# Patient Record
Sex: FEMALE | Race: WHITE | NOT HISPANIC OR LATINO | Employment: UNEMPLOYED | URBAN - METROPOLITAN AREA
[De-identification: names, ages, dates, MRNs, and addresses within clinical notes are randomized per-mention and may not be internally consistent; named-entity substitution may affect disease eponyms.]

---

## 2023-02-23 NOTE — PATIENT INSTRUCTIONS
Breast Self Exam for Women   AMBULATORY CARE:   A breast self-exam (BSE)  is a way to check your breasts for lumps and other changes  Regular BSEs can help you know how your breasts normally look and feel  Most breast lumps or changes are not cancer, but you should always have them checked by a healthcare provider  Why you should do a BSE:  Breast cancer is the most common type of cancer in women  Even if you have mammograms, you may still want to do a BSE regularly  If you know how your breasts normally feel and look, it may help you know when to contact your healthcare provider  Mammograms can miss some cancers  You may find a lump during a BSE that did not show up on a mammogram   When you should do a BSE:  If you have periods, you may want to do your BSE 1 week after your period ends  This is the time when your breasts may be the least swollen, lumpy, or tender  You can do regular BSEs even if you are breastfeeding or have breast implants  Call your doctor if:   You find any lumps or changes in your breasts  You have breast pain or fluid coming from your nipples  You have questions or concerns about your condition or care  How to do a BSE:       Look at your breasts in a mirror  Look at the size and shape of each breast and nipple  Check for swelling, lumps, dimpling, scaly skin, or other skin changes  Look for nipple changes, such as a nipple that is painful or beginning to pull inward  Gently squeeze both nipples and check to see if fluid (that is not breast milk) comes out of them  If you find any of these or other breast changes, contact your healthcare provider  Check your breasts while you sit or  the following 3 positions:    [place] your arms down at your sides  Raise your hands and join them behind your head  Put firm pressure with your hands on your hips  Bend slightly forward while you look at your breasts in the mirror  Lie down and feel your breasts    When you lie down, your breast tissue spreads out evenly over your chest  This makes it easier for you to feel for lumps and anything that may not be normal for your breasts  Do a BSE on one breast at a time  Place a small pillow or towel under your left shoulder  Put your left arm behind your head  Use the 3 middle fingers of your right hand  Use your fingertip pads, on the top of your fingers  Your fingertip pad is the most sensitive part of your finger  Use small circles to feel your breast tissue  Use your fingertip pads to make dime-sized, overlapping circles on your breast and armpits  Use light, medium, and firm pressure  First, press lightly  Second, press with medium pressure to feel a little deeper into the breast  Last, use firm pressure to feel deep within your breast     Examine your entire breast area  Examine the breast area from above the breast to below the breast where you feel only ribs  Make small circles with your fingertips, starting in the middle of your armpit  Make circles going up and down the breast area  Continue toward your breast and all the way across it  Examine the area from your armpit all the way over to the middle of your chest (breastbone)  Stop at the middle of your chest     Move the pillow or towel to your right shoulder, and put your right arm behind your head  Use the 3 fingertip pads of your left hand, and repeat the above steps to do a BSE on your right breast   What else you can do to check for breast problems or cancer:  Talk to your healthcare provider about mammograms  A mammogram is an x-ray of your breasts to screen for breast cancer or other problems  Your provider can tell you the benefits and risks of mammograms  The first mammogram is usually at age 39 or 48  Your provider may recommend you start at 36 or younger if your risk for breast cancer is high  Mammograms usually continue every 1 to 2 years until age 76         Follow up with your doctor as directed:  Write down your questions so you remember to ask them during your visits  © [employer] 2022 Information is for End User's use only and may not be sold, redistributed or otherwise used for commercial purposes  The above information is an  only  It is not intended as medical advice for individual conditions or treatments  Talk to your doctor, nurse or pharmacist before following any medical regimen to see if it is safe and effective for you  Wellness Visit for Adults   AMBULATORY CARE:   A wellness visit  is when you see your healthcare provider to get screened for health problems  Your healthcare provider will also give you advice on how to stay healthy  Write down your questions so you remember to ask them  Ask your healthcare provider how often you should have a wellness visit  What happens at a wellness visit:  Your healthcare provider will ask about your health, and your family history of health problems  This includes high blood pressure, heart disease, and cancer  He or she will ask if you have symptoms that concern you, if you smoke, and about your mood  You may also be asked about your intake of medicines, supplements, food, and alcohol  Any of the following may be done: Your weight  will be checked  Your height may also be checked so your body mass index (BMI) can be calculated  Your BMI shows if you are at a healthy weight  Your blood pressure  and heart rate will be checked  Your temperature may also be checked  Blood and urine tests  may be done  Blood tests may be done to check your cholesterol levels  Abnormal cholesterol levels increase your risk for heart disease and stroke  You may also need a blood or urine test to check for diabetes if you are at increased risk  Urine tests may be done to look for signs of an infection or kidney disease  A physical exam  includes checking your heartbeat and lungs with a stethoscope   Your healthcare provider may also check your skin to look for sun damage  Screening tests  may be recommended  A screening test is done to check for diseases that may not cause symptoms  The screening tests you may need depend on your age, gender, family history, and lifestyle habits  For example, colorectal screening may be recommended if you are 48years old or older  Screening tests you need if you are a woman:   A Pap smear  is used to screen for cervical cancer  Pap smears are usually done every 3 to 5 years depending on your age  You may need them more often if you have had abnormal Pap smear test results in the past  Ask your healthcare provider how often you should have a Pap smear  A mammogram  is an x-ray of your breasts to screen for breast cancer  Experts recommend mammograms every 2 years starting at age 48 years  You may need a mammogram at age 52 years or younger if you have an increased risk for breast cancer  Talk to your healthcare provider about when you should start having mammograms and how often you need them  Vaccines you may need:   Get an influenza vaccine  every year  The influenza vaccine protects you from the flu  Several types of viruses cause the flu  The viruses change over time, so new vaccines are made each year  Get a tetanus-diphtheria (Td) booster vaccine  every 10 years  This vaccine protects you against tetanus and diphtheria  Tetanus is a severe infection that may cause painful muscle spasms and lockjaw  Diphtheria is a severe bacterial infection that causes a thick covering in the back of your mouth and throat  Get a human papillomavirus (HPV) vaccine  if you are female and aged 23 to 32 or male 23 to 24 and never received it  This vaccine protects you from HPV infection  HPV is the most common infection spread by sexual contact  HPV may also cause vaginal, penile, and anal cancers  Get a pneumococcal vaccine  if you are aged 72 years or older   The pneumococcal vaccine is an injection given to protect you from pneumococcal disease  Pneumococcal disease is an infection caused by pneumococcal bacteria  The infection may cause pneumonia, meningitis, or an ear infection  Get a shingles vaccine  if you are 60 or older, even if you have had shingles before  The shingles vaccine is an injection to protect you from the varicella-zoster virus  This is the same virus that causes chickenpox  Shingles is a painful rash that develops in people who had chickenpox or have been exposed to the virus  How to eat healthy:  My Plate is a model for planning healthy meals  It shows the types and amounts of foods that should go on your plate  Fruits and vegetables make up about half of your plate, and grains and protein make up the other half  A serving of dairy is included on the side of your plate  The amount of calories and serving sizes you need depends on your age, gender, weight, and height  Examples of healthy foods are listed below:  Eat a variety of vegetables  such as dark green, red, and orange vegetables  You can also include canned vegetables low in sodium (salt) and frozen vegetables without added butter or sauces  Eat a variety of fresh fruits , canned fruit in 100% juice, frozen fruit, and dried fruit  Include whole grains  At least half of the grains you eat should be whole grains  Examples include whole-wheat bread, wheat pasta, brown rice, and whole-grain cereals such as oatmeal     Eat a variety of protein foods such as seafood (fish and shellfish), lean meat, and poultry without skin (turkey and chicken)  Examples of lean meats include pork leg, shoulder, or tenderloin, and beef round, sirloin, tenderloin, and extra lean ground beef  Other protein foods include eggs and egg substitutes, beans, peas, soy products, nuts, and seeds  Choose low-fat dairy products such as skim or 1% milk or low-fat yogurt, cheese, and cottage cheese  Limit unhealthy fats  such as butter, hard margarine, and shortening  Exercise:  Exercise at least 30 minutes per day on most days of the week  Some examples of exercise include walking, biking, dancing, and swimming  You can also fit in more physical activity by taking the stairs instead of the elevator or parking farther away from stores  Include muscle strengthening activities 2 days each week  Regular exercise provides many health benefits  It helps you manage your weight, and decreases your risk for type 2 diabetes, heart disease, stroke, and high blood pressure  Exercise can also help improve your mood  Ask your healthcare provider about the best exercise plan for you  General health and safety guidelines:   Do not smoke  Nicotine and other chemicals in cigarettes and cigars can cause lung damage  Ask your healthcare provider for information if you currently smoke and need help to quit  E-cigarettes or smokeless tobacco still contain nicotine  Talk to your healthcare provider before you use these products  Limit alcohol  A drink of alcohol is 12 ounces of beer, 5 ounces of wine, or 1½ ounces of liquor  Lose weight, if needed  Being overweight increases your risk of certain health conditions  These include heart disease, high blood pressure, type 2 diabetes, and certain types of cancer  Protect your skin  Do not sunbathe or use tanning beds  Use sunscreen with a SPF 15 or higher  Apply sunscreen at least 15 minutes before you go outside  Reapply sunscreen every 2 hours  Wear protective clothing, hats, and sunglasses when you are outside  Drive safely  Always wear your seatbelt  Make sure everyone in your car wears a seatbelt  A seatbelt can save your life if you are in an accident  Do not use your cell phone when you are driving  This could distract you and cause an accident  Pull over if you need to make a call or send a text message  Practice safe sex  Use latex condoms if are sexually active and have more than one partner   Your healthcare provider may recommend screening tests for sexually transmitted infections (STIs)  Wear helmets, lifejackets, and protective gear  Always wear a helmet when you ride a bike or motorcycle, go skiing, or play sports that could cause a head injury  Wear protective equipment when you play sports  Wear a lifejacket when you are on a boat or doing water sports  © Copyright Sherie Salas 2022 Information is for End User's use only and may not be sold, redistributed or otherwise used for commercial purposes  The above information is an  only  It is not intended as medical advice for individual conditions or treatments  Talk to your doctor, nurse or pharmacist before following any medical regimen to see if it is safe and effective for you  HPV (Human Papillomavirus)   AMBULATORY CARE:   Human Papillomavirus (HPV)  is the name for a group of viruses that can infect your skin or other parts of your body  HPV is the most common infection spread by sexual contact  It can also be spread from a mother to her baby during delivery  Common symptoms include the following:   Painless warts in your mouth or on your genitals    Genital or anal discharge, bleeding, itching, or pain    Pain when you urinate    Call your doctor if:   You have new or worsening symptoms  You have questions or concerns about your condition or care  HPV diagnosis:  Your healthcare provider may use a vinegar liquid to help diagnose HPV genital warts  Women 27to 72years old can be checked for HPV during regular cervical cancer screenings  An HPV test checks for certain types of HPV that can cause changes in cervical cells  Without treatment, the changed cells can become cancer  An HPV test can be done every 5 years if the results show no infection  The test can be done with or without a Pap smear  A Pap smear checks for cancer or for abnormal cells that can become cancer   You may be tested for HPV if you have mouth or throat cancer  Treatment:  HPV cannot be cured, but an infection may go away on its own in about 2 years without causing problems  If the infection continues, some types of HPV can lead to health conditions that need to be treated  Examples include warts and certain cancers, especially squamous cell carcinoma (SCC)  HPV-linked SCCs commonly develop in the anus, throat (called oropharyngeal cancer), cervix, vagina, penis, or mouth  HPV can also cause a type of cervical cancer called adenocarcinoma  Symptoms of any of these conditions may not develop for several years after you were exposed to HPV  You will need to be monitored closely  Ask your healthcare provider for more information about monitoring, conditions caused by HPV, and available treatments  Prevent an HPV infection:  HPV is usually spread through sexual activity  The following can help prevent infection:  Ask about the HPV vaccine  The HPV vaccine is given to females and males, usually at 6or 15years of age  It can be given from 9 years through 39years of age, if needed  It is most effective if given before sexual activity begins  Use a new condom, contraceptive barrier, or dental dam each time you have sex  This includes oral, vaginal, and anal sex  Talk to your healthcare provider if you have any questions about what to use or how to use it  Follow up with your doctor as directed:  Write down your questions so you remember to ask them during your visits  © Copyright [facility] 2022 Information is for End User's use only and may not be sold, redistributed or otherwise used for commercial purposes  The above information is an  only  It is not intended as medical advice for individual conditions or treatments  Talk to your doctor, nurse or pharmacist before following any medical regimen to see if it is safe and effective for you    Kegel Exercises for Women   AMBULATORY CARE:   Kegel exercises  help strengthen your pelvic muscles  Pelvic muscles hold your pelvic organs, such as your bladder and uterus, in place  Kegel exercises help prevent or control certain conditions, such as urine incontinence (leakage) or uterine prolapse  Call your doctor or physical therapist if:   You cannot feel your muscles tighten or relax  You continue to leak urine  You have questions or concerns about your condition or care  Use the correct muscles:  Pelvic muscles are the muscles you use to control urine flow  To target these muscles, stop and start the flow of urine several times  This will help you become familiar with how it feels to tighten and relax these muscles  How to do Kegel exercises:   Get into a comfortable position  You may lie down, stand up, or sit down to do these exercises  When you first try to do these exercises, it may be easier if you lie down  Tighten or squeeze your pelvic muscles slowly  It may feel like you are trying to hold back urine or gas  Hold this position for 3 seconds  Relax for 3 seconds  Repeat this cycle 10 times  Do not hold your breath when you do Kegel exercises  Keep your stomach, back, and leg muscles relaxed  Do 10 sets of Kegel exercises, at least 3 times a day  When you know how to do Kegel exercises, use different positions  This will help to strengthen your pelvic muscles as much as possible  You can do these exercises while you lie on the floor, watch TV, or while you stand  Tighten your pelvic muscles before you sneeze, cough, or lift to prevent urine leakage  You may notice improved bladder control within about 6 weeks  Follow up with your doctor or physical therapist as directed:  Write down your questions so you remember to ask them during your visits  © Copyright [employer]it [employer]on 2022 Information is for End User's use only and may not be sold, redistributed or otherwise used for commercial purposes  The above information is an  only   It is not intended as medical advice for individual conditions or treatments  Talk to your doctor, nurse or pharmacist before following any medical regimen to see if it is safe and effective for you  Perineal Hygiene      Your vaginal naturally takes care of its self, it is a self washing system, the less you mess the healthier it will be     No soaps or feminine wash to the vulva, these products can cause dermitis, bacterial infections and other vulvar problems  Use only water to cleanse, or water with Dove or [employer] if necessary  No scented lotions or products are advised in or near your vulva  Use only coconut oil for moisture if needed  No douching this may cause imbalance in your vaginal PH and further issues  If you wear panty liners, you may apply a thin coating of Vaseline, A&D ointment or coconut oil to the vulvar tissues as a skin barrier     [place] underware, loose fitting clothing  Only perfume-free, dye-free laundry detergent, use a second rinse cycle   Avoid fabric softeners/dryer sheets  Partner should avoid the same products as well  Over the counter probiotic to restore vaginal bro may be helpful as well, take daily  You may also look into Boric Acid vaginal suppositories to restore vaginal PH balance for up to 2 weeks as directed on the box  You may not use these if you are pregnant      For vaginal dryness: You may use:     Coconut oil (organic, pure, unscented) as needed for moisture or lubrication  ( Do not use if allergic)       Replens moisture restore external comfort gel daily ( use as directed on the box)        Replens long lasting vaginal moisturizer  ( use as directed on the box)         For Vaginal Lubrication:          You may use:     Coconut oil (organic, pure, unscented) as a lubricant or another scent-free lubricant (Astroglide, Uberlube) if needed    Do not use coconut oil or silicone if using a condom as this may break down the integrity of the condom and cause an unplanned pregnancy              Do not use coconut oil if allergic               Replens silky smooth lubricant, premium silicone based lubricant for intercourse  ( use as directed, a small amount will provide an enhanced natural feeling)     Any premium over the counter vaginal lubricant water or silicone based  Silicone based will have more staying power  Natural Family Planning   AMBULATORY CARE:   Natural family planning  is a way to prevent or plan a pregnancy  Natural family planning helps you understand your body's rhythms to learn when you are most likely to get pregnant each month  Contact your healthcare provider if:   You have questions or concerns about natural family planning  Natural family planning methods: The symptothermal method  means you will chart your daily body temperature  You will need to buy a basal body thermometer at the store  Take your basal body temperature before you get out of bed in the morning  Keep track of each reading  Your basal body temperature will increase when you ovulate  You are more likely to get pregnant when it is higher  The cervical mucus discharge method  means you will check your cervical mucus each day  Discharge is clear, slippery, and stretchy when you ovulate  The amount is also higher than usual  Discharge is thick, sticky, and cloudy when you are not ovulating  Check your cervical mucus each day to learn when you ovulate  The calendar, or rhythm, method  means you will count the number of days between your periods  This will tell you when you ovulate  This method can work if the number of days in your cycle is the same each month  If you have irregular monthly periods, the natural family planning method is not as accurate  What else you need to know about natural family planning:   Natural family planning does not decrease your risk for a sexually transmitted infection (STI)      The success of natural family planning depends on how well you measure your menstrual cycle  You will need to learn the exact timing of your cycle and follow it closely  Your menstrual cycle can be affected by any of the following:     Changes in exercise habits    Caffeine or unhealthy foods    Stress, depression, and anxiety    Health problems or illness    Increasing age    Weight gain or loss    Risks of not following natural family planning correctly: You may become pregnant when you do not want to  It may be hard to know when you are fertile  Follow up with your doctor as directed:  Write down your questions so you remember to ask them during your visits  © Copyright \[facility]\"" [place] 2022 Information is for End User's use only and may not be sold, redistributed or otherwise used for commercial purposes  The above information is an  only  It is not intended as medical advice for individual conditions or treatments  Talk to your doctor, nurse or pharmacist before following any medical regimen to see if it is safe and effective for you

## 2023-02-23 NOTE — PROGRESS NOTES
Diagnoses and all orders for this visit:    Encounter for gynecological examination without abnormal finding    Pre-conception counseling  -     Prenatal Vit-Fe Fumarate-FA (prenatal vitamin) 28-0 8 mg; Take 1 tablet by mouth daily        Perineal hygiene reviewed   Weight bearing exercises minium of 150 mins/weekly advised  Kegel exercises recommended  SBE encouraged, ASCCP guidelines reviewed  Condoms encouraged with all sexual activity to prevent STI's  Gardisil vaccines recommended up to age 39  Calcium/ Vit D dietary requirements discussed,   Advised to call with any issues,  all concerns & questions addressed  See provided information in your after visit summary   Start prenatal vitamins   F/U Annually and PRN      Health Maintenance:    Last PAP: 04/23/2021 Neg  Next PAP Due: 2024    Last Mammogram: Not on file  advised age 36     Last Colonoscopy: Not on file    advised age 39    Gardisil: Completed       Subjective    CC: Yearly Exam      Cruzito Oakes is a 32 y o  female here for an annual exam  Alysha Wang  GYN hx includes: History of irregular menses & OCP since age 16 to regulate menses  Stopped taking 2 years ago  Reports regular cycles for the past 4 months   No personal Hx of breast, cervical, ovarian or colon CA  Family hx of:  No GYN cancers  Medically stable, reports no changes in medical Hx, follows with PMD    Patient's last menstrual period was 02/05/2023 (exact date)  Her menstrual cycles are regular every 28-30 days for the past 4 cycles, normally they are much longer in frequenci   She denies issues with bleeding during her menses  Denies history of abnormal pap smear  She denies breast concerns, abnormal vaginal discharge, vaginal itching, odor, irritation, bowel/bladder dysfunction, urinary symptoms, pelvic pain, or dyspareunia today  She is sexually active  Monogamous relationship  Her current method of contraception includes condoms  Denies any issues with her BCM  Would like to start trying to conceive this spring   She does not want STD testing today  Denies intimate partner violence    Works at [employer]  last year     Past Medical History:   Diagnosis Date   • Varicella     twice     History reviewed  No pertinent surgical history  Immunization History   Administered Date(s) Administered   • COVID-19 MODERNA VACC 0 5 ML IM 2021, 2021   • Tuberculin Skin Test-PPD Intradermal 2019       Family History   Problem Relation Age of Onset   • No Known Problems Mother    • Asthma Father    • Breast cancer Neg Hx    • Colon cancer Neg Hx    • Ovarian cancer Neg Hx    • Uterine cancer Neg Hx    • Cervical cancer Neg Hx      Social History     Tobacco Use   • Smoking status: Former   • Smokeless tobacco: Never   Vaping Use   • Vaping Use: Never used   Substance Use Topics   • Alcohol use: Not Currently     Comment: occ   • Drug use: Never       Current Outpatient Medications:   •  Multiple Vitamins-Minerals (WOMENS MULTI PO), Take 1 tablet by mouth daily, Disp: , Rfl:   •  Prenatal Vit-Fe Fumarate-FA (prenatal vitamin) 28-0 8 mg, Take 1 tablet by mouth daily, Disp: 90 tablet, Rfl: 3  Patient Active Problem List    Diagnosis Date Noted   • PCOS (polycystic ovarian syndrome) 2022   • Encounter for annual routine gynecological examination 2021   • Irregular menses 2021       Allergies   Allergen Reactions   • Escitalopram Rash   • Fluoxetine Rash   • Pollen Extract Sneezing   • Amoxicillin-Pot Clavulanate Itching and Rash   • Cefixime Rash   • Levonorgestrel Rash       OB History    Para Term  AB Living   0 0 0 0 0 0   SAB IAB Ectopic Multiple Live Births   0 0 0 0 0       Vitals:    23 1459   BP: 112/78   BP Location: Left arm   Patient Position: Sitting   Cuff Size: Standard   Weight: 58 5 kg (129 lb)   Height: 5' 1" (1 549 m)     Body mass index is 24 37 kg/m²      Review of Systems     Constitutional: Negative for chills, fatigue, fever, headaches, visual disturbances, and unexpected weight change  Respiratory: Negative for cough, & shortness of breath  Cardiovascular: Negative for chest pain       Gastrointestinal: Negative for Abd pain, nausea & vomiting, constipation and diarrhea  Genitourinary: Negative for difficulty urinating, dysuria, hematuria, dyspareunia, unusual vaginal bleeding or discharge  Skin: Negative skin changes    Physical Exam     Constitutional: Alert & Oriented x3, well-developed and well-nourished  No distress  HENT: Atraumatic, Normocephalic, Conjunctivae clear  Neck: Normal range of motion  Neck supple  No thyromegaly, mass, nodules or tenderness  Pulmonary: Effort normal    Abdominal: Soft  No tenderness or masses  Musculoskeletal: Normal ROM  Skin: Warm & Dry  Psychological: Normal mood, thought content, behavior & judgement     Breasts:   Right: tissue soft without masses, tenderness, skin changes or nipple discharge  No areas of erythema or pain  No subclavicular, axillary, pectoral adenopathy  Left:  tissue soft without masses, tenderness, skin changes or nipple discharge  No areas of erythema or pain  No subclavicular, axillary, pectoral adenopathy    Pelvic exam was performed with patient supine, lithotomy position  Labia: Negative rash, tenderness, lesion or injury on the right labia  Negative rash, tenderness, lesion or injury on the left labia  Urethral meatus:  Negative for  tenderness, inflammation or discharge  Uterus: not deviated, enlarged, fixed or tender  Cervix: No CMT, no discharge or friability  Right adnexa: no mass, no tenderness and no fullness  Left adnexa: no mass, no tenderness and no fullness  Vagina: No erythema, tenderness, masses, or foreign body in the vagina  No signs of injury around the vagina  No unusual vaginal discharge   Perineum without lesions, signs of injury, erythema or swelling    Inguinal Canal:        Right: No inguinal adenopathy or hernia present  Left: No inguinal adenopathy or hernia present

## 2023-02-24 ENCOUNTER — ANNUAL EXAM (OUTPATIENT)
Dept: OBGYN CLINIC | Facility: CLINIC | Age: 28
End: 2023-02-24

## 2023-02-24 VITALS
SYSTOLIC BLOOD PRESSURE: 112 MMHG | BODY MASS INDEX: 24.35 KG/M2 | DIASTOLIC BLOOD PRESSURE: 78 MMHG | WEIGHT: 129 LBS | HEIGHT: 61 IN

## 2023-02-24 DIAGNOSIS — Z31.69 PRE-CONCEPTION COUNSELING: ICD-10-CM

## 2023-02-24 DIAGNOSIS — Z01.419 ENCOUNTER FOR GYNECOLOGICAL EXAMINATION WITHOUT ABNORMAL FINDING: Primary | ICD-10-CM

## 2023-02-24 RX ORDER — PNV NO.95/FERROUS FUM/FOLIC AC 28MG-0.8MG
1 TABLET ORAL DAILY
Qty: 90 TABLET | Refills: 3 | Status: SHIPPED | OUTPATIENT
Start: 2023-02-24 | End: 2023-03-26

## 2023-02-24 NOTE — PROGRESS NOTES
Patient is here today for a gynecological annual exam    No questions or concerns today  Planning to conceive in the Spring  LMP: 2023   Menarche age: 15    BC: Condoms currently    Last pap: 2021     Hx of abnormal paps?  No    Gardasil: States series is completed

## 2023-05-05 ENCOUNTER — OFFICE VISIT (OUTPATIENT)
Dept: FAMILY MEDICINE CLINIC | Facility: CLINIC | Age: 28
End: 2023-05-05

## 2023-05-05 ENCOUNTER — APPOINTMENT (OUTPATIENT)
Dept: LAB | Facility: HOSPITAL | Age: 28
End: 2023-05-05

## 2023-05-05 VITALS
SYSTOLIC BLOOD PRESSURE: 106 MMHG | TEMPERATURE: 96.7 F | HEIGHT: 61 IN | HEART RATE: 58 BPM | OXYGEN SATURATION: 100 % | BODY MASS INDEX: 23.03 KG/M2 | WEIGHT: 122 LBS | DIASTOLIC BLOOD PRESSURE: 72 MMHG

## 2023-05-05 DIAGNOSIS — E28.2 PCOS (POLYCYSTIC OVARIAN SYNDROME): ICD-10-CM

## 2023-05-05 DIAGNOSIS — Z23 ENCOUNTER FOR IMMUNIZATION: Primary | ICD-10-CM

## 2023-05-05 DIAGNOSIS — Z00.00 ANNUAL PHYSICAL EXAM: ICD-10-CM

## 2023-05-05 LAB
ALBUMIN SERPL BCP-MCNC: 4.5 G/DL (ref 3.5–5)
ALP SERPL-CCNC: 42 U/L (ref 34–104)
ALT SERPL W P-5'-P-CCNC: 10 U/L (ref 7–52)
ANION GAP SERPL CALCULATED.3IONS-SCNC: 4 MMOL/L (ref 4–13)
AST SERPL W P-5'-P-CCNC: 15 U/L (ref 13–39)
BASOPHILS # BLD AUTO: 0.04 THOUSANDS/ÂΜL (ref 0–0.1)
BASOPHILS NFR BLD AUTO: 1 % (ref 0–1)
BILIRUB SERPL-MCNC: 0.45 MG/DL (ref 0.2–1)
BUN SERPL-MCNC: 12 MG/DL (ref 5–25)
CALCIUM SERPL-MCNC: 9.7 MG/DL (ref 8.4–10.2)
CHLORIDE SERPL-SCNC: 107 MMOL/L (ref 96–108)
CO2 SERPL-SCNC: 27 MMOL/L (ref 21–32)
CREAT SERPL-MCNC: 0.75 MG/DL (ref 0.6–1.3)
EOSINOPHIL # BLD AUTO: 0.11 THOUSAND/ÂΜL (ref 0–0.61)
EOSINOPHIL NFR BLD AUTO: 2 % (ref 0–6)
ERYTHROCYTE [DISTWIDTH] IN BLOOD BY AUTOMATED COUNT: 12.5 % (ref 11.6–15.1)
GFR SERPL CREATININE-BSD FRML MDRD: 108 ML/MIN/1.73SQ M
GLUCOSE P FAST SERPL-MCNC: 82 MG/DL (ref 65–99)
HCT VFR BLD AUTO: 40.3 % (ref 34.8–46.1)
HGB BLD-MCNC: 13.6 G/DL (ref 11.5–15.4)
IMM GRANULOCYTES # BLD AUTO: 0.01 THOUSAND/UL (ref 0–0.2)
IMM GRANULOCYTES NFR BLD AUTO: 0 % (ref 0–2)
LYMPHOCYTES # BLD AUTO: 1.95 THOUSANDS/ÂΜL (ref 0.6–4.47)
LYMPHOCYTES NFR BLD AUTO: 38 % (ref 14–44)
MCH RBC QN AUTO: 30.1 PG (ref 26.8–34.3)
MCHC RBC AUTO-ENTMCNC: 33.7 G/DL (ref 31.4–37.4)
MCV RBC AUTO: 89 FL (ref 82–98)
MONOCYTES # BLD AUTO: 0.37 THOUSAND/ÂΜL (ref 0.17–1.22)
MONOCYTES NFR BLD AUTO: 7 % (ref 4–12)
NEUTROPHILS # BLD AUTO: 2.62 THOUSANDS/ÂΜL (ref 1.85–7.62)
NEUTS SEG NFR BLD AUTO: 52 % (ref 43–75)
NRBC BLD AUTO-RTO: 0 /100 WBCS
PLATELET # BLD AUTO: 322 THOUSANDS/UL (ref 149–390)
PMV BLD AUTO: 9.7 FL (ref 8.9–12.7)
POTASSIUM SERPL-SCNC: 4.2 MMOL/L (ref 3.5–5.3)
PROT SERPL-MCNC: 7.2 G/DL (ref 6.4–8.4)
RBC # BLD AUTO: 4.52 MILLION/UL (ref 3.81–5.12)
SODIUM SERPL-SCNC: 138 MMOL/L (ref 135–147)
WBC # BLD AUTO: 5.1 THOUSAND/UL (ref 4.31–10.16)

## 2023-05-05 NOTE — PROGRESS NOTES
[place] [address] [address]    NAME: Reyes Bloch  AGE: 29 y o  SEX: female  : 1995     DATE: 2023     Assessment and Plan:     Problem List Items Addressed This Visit    None  Visit Diagnoses     Encounter for immunization    -  Primary    Relevant Orders    TDAP VACCINE GREATER THAN OR EQUAL TO 6YO IM (Completed)    Annual physical exam            Immunizations and preventive care screenings were discussed with patient today  Appropriate education was printed on patient's after visit summary  Counseling:  Alcohol/drug use: discussed moderation in alcohol intake, the recommendations for healthy alcohol use, and avoidance of illicit drug use  Dental Health: discussed importance of regular tooth brushing, flossing, and dental visits  Sexual health: discussed sexually transmitted diseases, partner selection, use of condoms, avoidance of unintended pregnancy, and contraceptive alternatives  Exercise: the importance of regular exercise/physical activity was discussed  Recommend exercise 3-5 times per week for at least 30 minutes  Return in about 1 year (around 2024) for Annual physical      Chief Complaint:     Chief Complaint   Patient presents with    Physical Exam     Wants a tetanus      History of Present Illness:     Adult Annual Physical   Patient here for a comprehensive physical exam  The patient reports no problems  Will be trying for pregnancy starting next month  She needs her Tdap vaccine  Diet and Physical Activity  Diet/Nutrition: well balanced diet  Exercise: no formal exercise  Depression Screening  PHQ-2/9 Depression Screening    Little interest or pleasure in doing things: 0 - not at all  Feeling down, depressed, or hopeless: 0 - not at all  PHQ-2 Score: 0  PHQ-2 Interpretation: Negative depression screen       General Health  Sleep: sleeps well     Hearing: normal - bilateral   Vision: goes for regular eye exams, most recent eye exam <1 year ago and wears glasses  Dental: regular dental visits, brushes teeth twice daily and flosses teeth occasionally  /GYN Health  Last menstrual period: 5/1/23  Contraceptive method: none  History of STDs?: no      Review of Systems:     Review of Systems      Past Medical History:     Past Medical History:   Diagnosis Date    Varicella     twice      Past Surgical History:     History reviewed  No pertinent surgical history  Social History:     Social History     Socioeconomic History    Marital status: /Civil Union     Spouse name: None    Number of children: None    Years of education: None    Highest education level: None   Occupational History    None   Tobacco Use    Smoking status: Former    Smokeless tobacco: Never   Vaping Use    Vaping Use: Never used   Substance and Sexual Activity    Alcohol use: Not Currently     Comment: occ    Drug use: Never    Sexual activity: Yes     Partners: Male     Birth control/protection: Condom   Other Topics Concern    None   Social History Narrative    None     Social Determinants of Health     Financial Resource Strain: Not on file   Food Insecurity: Not on file   Transportation Needs: Not on file   Physical Activity: Not on file   Stress: Not on file   Social Connections: Not on file   Intimate Partner Violence: Not on file   Housing Stability: Not on file      Family History:     Family History   Problem Relation Age of Onset    No Known Problems Mother     Asthma Father     Breast cancer Neg Hx     Colon cancer Neg Hx     Ovarian cancer Neg Hx     Uterine cancer Neg Hx     Cervical cancer Neg Hx       Current Medications:     Current Outpatient Medications   Medication Sig Dispense Refill    Prenatal Vit-Fe Fumarate-FA (prenatal vitamin) 28-0 8 mg Take 1 tablet by mouth daily 90 tablet 3     No current facility-administered medications for this visit  "Allergies: Allergies   Allergen Reactions    Escitalopram Rash    Fluoxetine Rash    Pollen Extract Sneezing    Amoxicillin-Pot Clavulanate Itching and Rash    Cefixime Rash    Levonorgestrel Rash      Physical Exam:     /72 (BP Location: Left arm, Patient Position: Sitting, Cuff Size: Adult)   Pulse 58   Temp (!) 96 7 °F (35 9 °C)   Ht 5' 1\" (1 549 m)   Wt 55 3 kg (122 lb)   SpO2 100%   BMI 23 05 kg/m²     Physical Exam  Vitals reviewed  Constitutional:       General: She is not in acute distress  Appearance: Normal appearance  HENT:      Head: Normocephalic and atraumatic  Right Ear: External ear normal       Left Ear: External ear normal       Nose: Nose normal       Mouth/Throat:      Mouth: Mucous membranes are moist    Eyes:      Extraocular Movements: Extraocular movements intact  Conjunctiva/sclera: Conjunctivae normal       Pupils: Pupils are equal, round, and reactive to light  Cardiovascular:      Rate and Rhythm: Normal rate and regular rhythm  Heart sounds: Normal heart sounds  Pulmonary:      Effort: Pulmonary effort is normal       Breath sounds: Normal breath sounds  Abdominal:      General: Bowel sounds are normal  There is no distension  Palpations: Abdomen is soft  Tenderness: There is no abdominal tenderness  Musculoskeletal:      Cervical back: Neck supple  Right lower leg: No edema  Left lower leg: No edema  Lymphadenopathy:      Cervical: No cervical adenopathy  Skin:     General: Skin is warm  Capillary Refill: Capillary refill takes less than 2 seconds  Findings: No rash  Neurological:      Mental Status: She is alert  Mental status is at baseline          Flores Davis DO   [place] [address] [address]"

## 2023-06-30 ENCOUNTER — ULTRASOUND (OUTPATIENT)
Dept: OBGYN CLINIC | Facility: MEDICAL CENTER | Age: 28
End: 2023-06-30
Payer: COMMERCIAL

## 2023-06-30 VITALS
SYSTOLIC BLOOD PRESSURE: 100 MMHG | BODY MASS INDEX: 22.62 KG/M2 | WEIGHT: 119.8 LBS | DIASTOLIC BLOOD PRESSURE: 66 MMHG | HEIGHT: 61 IN

## 2023-06-30 DIAGNOSIS — N91.2 AMENORRHEA: Primary | ICD-10-CM

## 2023-06-30 DIAGNOSIS — Z36.89 ESTABLISH GESTATIONAL AGE, ULTRASOUND: ICD-10-CM

## 2023-06-30 DIAGNOSIS — Z3A.08 8 WEEKS GESTATION OF PREGNANCY: ICD-10-CM

## 2023-06-30 PROCEDURE — 76801 OB US < 14 WKS SINGLE FETUS: CPT | Performed by: CLINICAL NURSE SPECIALIST

## 2023-06-30 PROCEDURE — 99213 OFFICE O/P EST LOW 20 MIN: CPT | Performed by: CLINICAL NURSE SPECIALIST

## 2023-06-30 NOTE — ASSESSMENT & PLAN NOTE
She is a  with Patient's last menstrual period was 2023 (exact date)  US today is showing a viable IUP heather is c/w GA by LMP  No change in [facility]   24  F/U for ob intake, followed by initial prenatal exam in  2 wks

## 2023-06-30 NOTE — PATIENT INSTRUCTIONS
"Again, congratulations on your pregnancy! NEXT STEPS  Get Prenatal bloodwork  Call MFM to schedule next ultrasound (done 12-13 wks)   Contact information for [facility] ([facility]A Maternal Fetal Medicine)- Main Number [phone]   Return to our office in 1-2 weeks for an OB intake and initial prenatal Exam(or sooner if any problems/concerns arise- see packet for things to report)  Check out [employer] website to read the [employer]" -this has lots of great early pregnancy information     It can be found by going to [employer]  org-->select services-->select women's health-->select Obstetrics  [email or web address]    Below is a variety of information that is useful in early pregnancy   Genetic screening can be very confusing for most people   We do recommend genetic screening universally for all pregnant women  Our goal is to have the most healthy uncomplicated pregnancy possible and this is one way to identify possible complications early  Common disorders we can screen for include: Down Syndrome, Neural tube defects ( like spina bifida or gastroschisis) and trisomy 15, even possibly more  There are several options we will talk more about  Below is some information to get you started thinking about this  We will discuss this more at the next appt  GUIDE TO GENETIC TESTING    Aneuploidy Testing  Trisomy 21 (Down Syndrome), Trisomy 18, and Open Neural Tube Defects (Spina Bifida)  You may choose one of the following options: See below for CPT/Diagnostic codes  NIPT (Non-Invasive Prenatal Testing or Cell Free- Fetal DNA): This simple and accurate non-invasive prenatal screening blood test offers results for early risk assessment of Down syndrome (Trisomy 21), or Trisomy 25, trisomy 15 and other aneuploidy conditions  The test also offers an optional analysis for fetal sex    The test analyzes the relative amount of 21, 18, 13; X and Y chromosome " material in circulating cell-free DNA from a maternal blood sample  This test can be performed at any time after 10 weeks gestation  If you elect this test, you will also have an AFP (alpha-fetoprotein) blood test to test for open neural tube defects  Recommended follow up to a positive result is genetic counseling and prenatal diagnosis  Sequential Screening with Nuchal Translucency: This is a two-step test to detect whether a fetus is at increased risk for trisomy 24, trisomy 25, trisomy 15 and open neural tube defects  The test has a narrow window for testing (the first step must be performed between 10 and 13 weeks gestation)  It includes two blood draws and an ultrasound  The ultrasound measures the amount of fluid behind the baby’s neck called the nuchal translucency (NT)  The blood tests measures three different maternal hormone levels, -- pregnancy associated plasma protein (DARREL-A), human chorionic gonadotrophin (hCG), and dimeric inhibin A (SILVA)  These measurements in combination with some maternal information such as height and weight are used to calculate whether the baby is at increased risk for Down Syndrome or Trisomy 18  An alpha-fetoprotein test (AFP) is also included to test for open neural tube defects  Recommended follow up to a positive result is additional testing that is more definitive, and referral for genetic counseling and prenatal diagnosis  Quad Screen: This test is also known as the quadruple marker test   It is a test that measures levels of four substances in a pregnant woman’s blood--Alpha-fetoprotein (AFP), a protein made by the developing baby; Human chorionic gonadotropin (hCG), a hormone made by the placenta; Estriol, a hormone made by the placenta and the baby’s liver; and Inhibin A, another hormone made by the placenta    Typically, the quad screen is done between weeks 15 and 20 of pregnancy--the second trimester and the results indicate whether the baby is at higher risk for Down Syndrome (Trisomy 21), Trisomy 18, and open neural tube defects  This is a screening test   Recommended follow up to a positive result is additional testing that is more definitive, as well as referral for genetic counseling and prenatal diagnosis  Trisomy 21 Trisomy 18 Trisomy 13   NIPT  (FPR 0 1%) <99% <98% 99%   Sequential Screening (FPR 3 5%) 92% 90% N/A   Quad Screen   (FPR 5%) 83% 80% N/A   (FPR is False Positive Rate)       Additional Screening Tests Offered  Cystic Fibrosis: Cystic Fibrosis is the most common inherited disease of children and young adults  The carrier frequency is 1 in 24, to 1 in 97  Both parents need to be carriers for a child to be affected (25% chance)  One in 200, children born are affected  Cystic fibrosis is a disorder of mucus production and produces abnormally thick mucus leading to life threatening lung infections, digestion problems, poor growth, infertility, and more  Symptoms range from mild to severe, but individuals with severe disease may die in childhood  With treatments today, people with Cystic Fibrosis can live into their 30’s  CF does not affect intelligence  Recommended follow up to a positive result is testing of the baby’s father  Spinal Muscular Atrophy (SMA): SMA is the most common inherited cause of early childhood death  The carrier frequency is 1 in 52 to 1 in 67 in the [place] and both parents need to be carriers for a child to be affected (25% chance)  1 in 11,000 children are affected  SMA is a progressive degeneration of lower motor neurons  Muscle weakness is the most common type with respiratory failure by the age of 3years old  Muscles responsible for crawling, walking, swallowing, and head and neck control are most severely affected  Recommended follow up to a positive result is testing of the baby’s father        Fragile X Syndrome (the most common inherited cause of developmental delays): Fragile X syndrome is an “X-linked” genetic disease, which means it is only carried by the mom  Unfortunately, 1 in 250 females are carriers and a child has a 50% chance of being affected if this is the case  1 in 4000 boys is affected with Fragile X and 1 in 8000 girls is affected  Approximately 1/3 of all children born with Fragile X also have autism and hyperactivity  Recommended follow up to a positive result is genetic counseling and prenatal diagnosis  Guide To Insurance Coverage For Genetic Screening  Due to the complexity of coverage guidelines, we are unable to quote benefits or guarantee insurance coverage for any of these tests  Insurance benefits are plan-specific and offer vastly different coverage based on your policy  The handout attached explains the benefits to each test, and also provides the billing (CPT) codes for each test   Even if the testing is covered, it could be applied to any unmet deductibles, and copays may apply, resulting in a bill  You are encouraged to contact your insurance company to obtain your benefits based on your age and risk factors, so that there are no surprises  Test Insurance Procedure (CPT) code   NIPT-cell free fetal DNA Most accurate non-invasive test- not always covered w/o risk factors 17270   Sequential Screen w/ NT US Current standard test-should be covered Part 1: (if lab is [facility]) 54767,96221   (If lab is [facility]) [address]  Part 2: (if lab is [facility])37167,98497,93674, [address]  (If lab is [facility]) 01514   Quad screen Old standard-use if past 1st trimester 38670, [address] [facility], [address], [address]   CF- Cystic Fibrosis  89304   SMA- Spinal Musc   Atrophy  68037   Fragile X  [id number]       Diagnosis code used Varies based on history- But will be one of these:  Encounter for  screening for other genetic defect Z36 8  Advanced Maternal Age (over 28) O12 46  Family History of Genetic Disease Carrier Z84 81    Please contact your insurance company with the appropriate CPT code from the attached list, and diagnosis code applicable to your situation  We ask that you review this information and decide what testing you would like to have performed  Please note that the Sequential Screening with Nuchal Translucency has a smaller window of time to be performed during pregnancy (Prior to 14 wks)  Warning Signs During Pregnancy  The list below includes warning signs your providers would like you to be aware of  If you experience any of these at any time during your pregnancy, please call us as soon as possible  Vaginal bleeding   Sharp abdominal pain that does not go away   Fever (more than 100  4? F and is not relieved with Tylenol)   Persistent vomiting lasting greater than 24 hours   Chest pain   Pain or burning when you urinate   Severe headache that doesn’t resolve with Tylenol   Blurred vision or seeing spots in your vision   Sudden swelling of your face or hands   Redness, swelling or pain in a leg   A sudden weight gain in just a few days   Decrease in your baby’s movements (after 28 weeks or the 6th month of pregnancy)   A loss of watery fluid from your vagina - can be a gush, a trickle or  continuous wetness   After 20 weeks of pregnancy, rhythmic cramping (greater than 4 per hour)  or menstrual like low/pelvic pain    Call the OFFICE [phone] for any questions/emergencies  At night or on the weekend, please indicate it is an emergency and the DOCTOR on call will be paged      Discomforts of Early Pregnancy    Tips for coping with nausea and vomiting during pregnancy   Eat meals and snacks slowly   Eat every 1-2 hours to avoid a full stomach   Don’t skip meals, avoid empty stomach   Eat a snack prior to getting out of bed   Avoid food and beverages with a strong aroma   Avoid dehydration - drink enough fluid to keep the urine pale yellow   Drink fluids before a meal to minimize the effect of a full stomach   Limit the amount of coffee and beverages that contain caffeine Eliminate spicy, odorous, high fat (fried foods), acidic (tomato products) and sweet foods   Fluids that contain lemon (lemonade), mint (tea) or orange can usually be well tolerated   Snacks and meals that contain low-fat protein (lean meats, fish, poultry and eggs) along with eating easily digestible carbs (fruit, rice, toast, crackers and dry cereal) may be tolerated better   Foods with ginger may be well tolerated  May use ginger root powder, capsules or extract (up to 1000 mg per day)   Drink liquids in small amounts    If symptoms persist, please contact your provider  Tips for coping with constipation during pregnancy   Increase fiber and fluids   - Drink 8-10 cups of liquid, like water or low-sugar juice daily  - Keep urine pale with fluids (water, milk), fruit and vegetables   Eat a well-balanced diet that contains high fiber food (fruits, vegetables, whole grain breads and cereals, bran and dried beans)   Take a 30-minute walk daily   You may take a mild stool softener such as Colace®    If symptoms persist, please contact your provider  For any emergencies, PLEASE CALL THE OFFICE at [phone]  If the office is closed, the doctor on call will be paged by the answering service  Medications and Pregnancy- see Pregnancy Essentials guide online- page 9    Expected Weight Gain During Pregnancy  If you have a healthy BMI (18-25) prior to pregnancy:  The recommended weight gain is between 25-35 pounds  Approximate weight gain  in the first trimester is 1-4 5 pounds  An expected weight gain during the second and  third trimester is approximately one pound per week  If you have a BMI of less than 18 prior to pregnancy,  you are considered underweight:  The recommended weight gain is between 28-40 pounds  Approximate weight gain  in the first trimester is 1-4 5 pounds  An expected weight gain during the second and  third trimester is just over one pound per week    If your BMI is 25 to 29 9: you are considered overweight:  You should gain 1/2 to 2/3 pound during the second and third trimester, for a total  weight gain of 15 to 25 pounds  If you have a BMI of greater than 30 prior to pregnancy,  you are considered overweight:  The recommended weight gain is between 15-25 pounds  Approximate weight gain  in the first trimester is 1-4 5 pounds  An expected weight gain during the second  and third trimester is approximately 0 5 pound per week  Foods to avoid during pregnancy:   Unpasteurized milk, juice and cheese  - Soft cheeses like feta or brie (if made with UNPASTEURIZED milk)   Unheated deli meats like lunchmeat and hotdogs   Undercooked poultry, beef, pork, seafood including raw sushi    What fish is safe to eat during pregnancy? Eat 8 to 12 ounces of fish a week  Pick from this group frequently, especially if you follow  the American Heart Association’s recommendation to eat fish at least 2 times a week  AVOID HIGH MERCURY FISH  A single meal of the following fish can put  you over the Environmental Protection  Agency’s safe limit for the month  High mercury fish:  Shark  Swordfish  [employer]  Tile Fish    Caffeine and Pregnancy    The March  and [employer] of Obstetrics and Gynecologists (ACOG) urge pregnant  women to limit their caffeine consumption to no more than 200 milligrams (mg) per day  This is  comparable to having one 12-ounce cup of coffee a day  This level has been shown not to increase risk  of miscarriage, growth or  labor complications  Effects of higher levels are not known  Exercise During Pregnancy  A daily exercise program that consists of 30 minutes a day is recommended  Low impact exercises like walking and swimming are great exercises throughout  all of pregnancy   If you’re an avid strength  avoid lifting very heavy weights - nothing more  than 30 pounds    Drink plenty of fluids while exercising to stay hydrated    Be careful to avoid overheating  ACTIVITIES TO AVOID   Exercises that can make you lose your balance  Activities that can put your baby at risk i e  horseback riding, scuba diving, skiing  or snowboarding  Any other sport that puts you at risk for getting hit in the  abdominal area  Do not use saunas, steam rooms or hot tubs (that have a higher temperature  than 100F)   After the first trimester, avoid exercises that require you to lay flat on your back  Avoid exceeding a heart rate greater than 140 beats per minute   As long as you are  able to hold a conversation while exercising your heart rate is likely acceptable

## 2023-06-30 NOTE — PROGRESS NOTES
"   Subjective  Patient ID: Yesenia Murdock is a 29 y o  female here for Pregnancy Ultrasound (Lmp /Jayesh 24 8w4d/Patient c/o nausea, some vomiting once, breast tenderness and left side mild pain /She denies and vaginal bleeding  This is the patient first pregnancy /She have no issues or concerns today )    Newly Pregnant  Patient's last menstrual period was 2023 (exact date)  giving her an JAYESH of 24 and a gestational age of  11 weeks 4days (based on LMP)    Menstrual cycle: regular, cycle length:  30 days- does have a hx of irregular periods  Pregnancy was planned  She has started taking a prenatal vitamin    She is C/O amenorrhea  Signs and symptoms of pregnancy:   • Breast tenderness: yes  • Fatigue: yes  • Cramping or Pelvic Pain: no  • Spotting or Vaginal Bleeding: no  • Nausea or vomiting: yes- mostly nausea    OB History    Para Term  AB Living   1 0 0 0 0 0   SAB IAB Ectopic Multiple Live Births   0 0 0 0 0      # Outcome Date GA Lbr Jarad/2nd Weight Sex Delivery Anes PTL Lv   1 Current                 The following portions of the patient's history were reviewed and updated as appropriate: allergies, current medications, past family history, past medical history, past social history, past surgical history, and problem list   Perinent hx that may affect pregnancy    The following portions of the patient's history were reviewed and updated as appropriate: allergies, current medications, past family history, past medical history, past social history, past surgical history, and problem list     Review of Systems    See HPI for pertinent positives  /66 (BP Location: Left arm, Patient Position: Sitting)   Ht 5' 1\" (1 549 m)   Wt 54 3 kg (119 lb 12 8 oz)   LMP 2023 (Exact Date)   BMI 22 64 kg/m²   OBGyn Exam      FIRST TRIMESTER OBSTETRIC ULTRASOUND     Patient's last menstrual period was 2023 (exact date)      INDICATION: Establish Gestational " Age       FINDINGS:  See imaging report for details     Additional Findings: none     FHR: 160  IMPRESSION:    Single intrauterine pregnancy of 8 weeks 0days gestational age  Fetal cardiac activity detected  No adnexal masses seen  EDC by LMP: 24  EDC by this Ultrasound: 24     Assigning a Final JAYESH  Please choose how you are assigning the JAYESH: The gestational age by LMP is </= 8w 6d and demonstrates 5 or fewer days difference from the gestational age by [facility], therefore the final JAYESH will be based on the LMP    Final JAYESH:  by LMP  DIAMOND [facility] [address]  Dept: [phone]  Dept Fax: [phone]  Loc: [phone]  Loc Fax: [phone]  Ultrasound Probe Disinfection    A transvaginal ultrasound was performed  Prior to use, disinfection was performed with High Level Disinfection Process (Trophon)  Probe serial number F: [id number] was used  Assessment/Plan:                 Problem List Items Addressed This Visit     8 weeks gestation of pregnancy     She is a  with Patient's last menstrual period was 2023 (exact date)  US today is showing a viable IUP heather is c/w GA by LMP  No change in [facility]   24  F/U for ob intake, followed by initial prenatal exam in  2 wks               Other Visit Diagnoses     Amenorrhea    -  Primary    Relevant Orders    Ambulatory Referral to Maternal Fetal Medicine    AMB US OB < 14 weeks single or first gestation level 1 (Completed)    Establish gestational age, ultrasound              Orders Placed This Encounter   Procedures   • Ambulatory Referral to Maternal Fetal Medicine   • AMB US OB < 14 weeks single or first gestation level 1

## 2023-07-14 ENCOUNTER — INITIAL PRENATAL (OUTPATIENT)
Dept: OBGYN CLINIC | Facility: CLINIC | Age: 28
End: 2023-07-14

## 2023-07-14 VITALS — HEIGHT: 61 IN | BODY MASS INDEX: 22.47 KG/M2 | WEIGHT: 119 LBS

## 2023-07-14 DIAGNOSIS — Z34.01 ENCOUNTER FOR SUPERVISION OF NORMAL FIRST PREGNANCY IN FIRST TRIMESTER: Primary | ICD-10-CM

## 2023-07-14 PROCEDURE — OBC: Performed by: CLINICAL NURSE SPECIALIST

## 2023-07-14 NOTE — PROGRESS NOTES
OB INTAKE INTERVIEW  Patient is 28 y.o.y.o. who presents for OB intake at 10wks  She is accompanied by herself during this encounter  The father of her baby Cleopatra Holstein) is involved in the pregnancy and is 28years old.   Last Menstrual Period: 2023   Ultrasound: Measured 8 weeks 0 days on 2023 by Kamille Hart  Estimated Date of Delivery: 2024 confirmed by 8 week US    Signs/Symptoms of Pregnancy  Current pregnancy symptoms: nausea   Constipation no  Headaches no  Cramping/spotting no  PICA cravings no    Diabetes-  Body mass index is 22.48 kg/m². If patient has 1 or more, please order early 1 hour GTT  History of GDM no  BMI >35 no  History of PCOS or current metformin use no  History of LGA/macrosomic infant (4000g/9lbs) no    If patient has 2 or more, please order early 1 hour GTT  BMI>30 no  AMA no  First degree relative with type 2 diabetes no  History of chronic HTN, hyperlipidemia, elevated A1C no  High risk race (, , ,  or ) no    Hypertension- if you answer yes, please order preeclampsia labs (cbc, comprehensive metabolic panel, urine protein creatinine ratio, uric acid)  History of of chronic HTN no  History of gestational HTN no  History of preeclampsia, eclampsia, or HELLP syndrome no  History of diabetes no  History of lupus, autoimmune disease, kidney disease no    Thyroid- if yes order TSH with reflex T4  History of thyroid disease no    Bleeding Disorder or Hx of DVT-patient or first degree relative with history of. Order the following if not done previously.    (Factor V, antithrombin III, prothrombin gene mutation, protein C and S Ag, lupus anticoagulant, anticardiolipin, beta-2 glycoprotein)   no    OB/GYN-  History of abnormal pap smear no       Date of last pap smear 2021  History of HPV no  History of Herpes/HSV no  History of other STI (gonorrhea, chlamydia, trich) no  History of prior  no  History of prior  no  History of  delivery prior to 36 weeks 6 days no  History of blood transfusion no  Ok for blood transfusion YES    Substance screening- if yes outside of tobacco for her or anyone in her home-order urine drug screen  History of tobacco use no  Currently using tobacco no  Currently using alcohol no  Presently using drugs no  Past drug use  no  IV drug use- no  Partner drug use no  Parent/Family drug use no    MRSA Screening-   Does the pt have a hx of MRSA? no  If yes- please follow MRSA protocol and obtain a nasal swab for MRSA culture    Immunizations:  Influenza vaccine given this season NO  Discussed Tdap vaccine YES  Discussed COVID Vaccine YES    Genetic/MFM-  Do you or your partner have a history of any of the following in yourselves or first degree relatives? Cystic fibrosis no  Spinal muscular atrophy no  Hemoglobinopathy/Sickle Cell/Thalassemia no  Fragile X Intellectual Disability no    If yes, discuss carrier screening and recommend consultation with MFM/genetic counseling. If no, discuss option for carrier screening and/or genetic testing with Nuchal Ultrasound. Patient interested thinking about it- had a consult  Appointment at [facility] made YES both set up    Rupert Scruggs's Pregnancy Essentials Book reviewed, discussed and attached to their AVS YES    Nurse/Family Partnership- patient may qualify YES; referral placed NO    Prenatal lab work scripts YES  Extra labs ordered:   Thinking about carrier screening        Aspirin/Preeclampsia Screen    Risk Level Risk Factor Recommendation   LOW Prior Uncomplicated full-term delivery no No Aspirin recommendation        MODERATE Nulliparity YES Recommend low-dose aspirin if     BMI>30 YES 2 or more moderate risk factors    Family History Preeclampsia (mother/sister) no     35yr old or greater no      or Low Socioeconomic no     IVF Pregnancy  no     Personal History Risks (low birth weight, prior adverse preg outcome, >10yr preg interval) no         HIGH History of Preeclampsia no Recommend low-dose aspirin if     Multifetal gestation no 1 or more high risk factors    Chronic HTN no     Type 1 or 2 Diabetes no     Renal Disease no     Autoimmune Disease  no      Contraindications to ASA therapy:  NSAID/ ASA allergy: no  Nasal polyps: no  Asthma with history of ASA induced bronchospasm: no  Relative contraindications:  History of GI bleed: no  Active peptic ulcer disease: no  Severe hepatic dysfunction: no    Patient should be recommended to take ASA 162mg during this pregnancy from 12-36wks to lower her risk of preeclampsia: NO      The patient has a history now or in prior pregnancy notable for:   none      Details that I feel the provider should be aware of: Linda Stockton and João Lara are expecting their 1st baby! Previous pt with NAOMI. She is hanging in there, had a lot of early nausea issues. We discussed things to help with that and it is improving. 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 PN1 visit scheduled. The patient was oriented to our practice, reviewed delivering physicians and [facility] for Delivery. All questions were answered.     Interviewed by: Tarik Kapoor MA

## 2023-07-14 NOTE — PATIENT INSTRUCTIONS
Congratulations!! Please review our Pregnancy Essential Guide and [facility] L[facility]D Virtual tour from our [facility]. . [facility] Pregnancy Essentials Guide  [facility] ([facility])     [address] [facility] ([employer])

## 2023-07-15 ENCOUNTER — APPOINTMENT (OUTPATIENT)
Dept: LAB | Facility: HOSPITAL | Age: 28
End: 2023-07-15
Payer: COMMERCIAL

## 2023-07-15 DIAGNOSIS — Z34.01 ENCOUNTER FOR SUPERVISION OF NORMAL FIRST PREGNANCY IN FIRST TRIMESTER: ICD-10-CM

## 2023-07-15 LAB
ABO GROUP BLD: NORMAL
BACTERIA UR QL AUTO: ABNORMAL /HPF
BASOPHILS # BLD AUTO: 0.02 THOUSANDS/ÂΜL (ref 0–0.1)
BASOPHILS NFR BLD AUTO: 0 % (ref 0–1)
BILIRUB UR QL STRIP: NEGATIVE
BLD GP AB SCN SERPL QL: NEGATIVE
CLARITY UR: CLEAR
COLOR UR: ABNORMAL
EOSINOPHIL # BLD AUTO: 0.11 THOUSAND/ÂΜL (ref 0–0.61)
EOSINOPHIL NFR BLD AUTO: 1 % (ref 0–6)
ERYTHROCYTE [DISTWIDTH] IN BLOOD BY AUTOMATED COUNT: 12.7 % (ref 11.6–15.1)
GLUCOSE UR STRIP-MCNC: NEGATIVE MG/DL
HCT VFR BLD AUTO: 40 % (ref 34.8–46.1)
HGB BLD-MCNC: 13.6 G/DL (ref 11.5–15.4)
HGB UR QL STRIP.AUTO: NEGATIVE
IMM GRANULOCYTES # BLD AUTO: 0.02 THOUSAND/UL (ref 0–0.2)
IMM GRANULOCYTES NFR BLD AUTO: 0 % (ref 0–2)
KETONES UR STRIP-MCNC: NEGATIVE MG/DL
LEUKOCYTE ESTERASE UR QL STRIP: ABNORMAL
LYMPHOCYTES # BLD AUTO: 1.46 THOUSANDS/ÂΜL (ref 0.6–4.47)
LYMPHOCYTES NFR BLD AUTO: 17 % (ref 14–44)
MCH RBC QN AUTO: 30 PG (ref 26.8–34.3)
MCHC RBC AUTO-ENTMCNC: 34 G/DL (ref 31.4–37.4)
MCV RBC AUTO: 88 FL (ref 82–98)
MONOCYTES # BLD AUTO: 0.55 THOUSAND/ÂΜL (ref 0.17–1.22)
MONOCYTES NFR BLD AUTO: 6 % (ref 4–12)
NEUTROPHILS # BLD AUTO: 6.42 THOUSANDS/ÂΜL (ref 1.85–7.62)
NEUTS SEG NFR BLD AUTO: 76 % (ref 43–75)
NITRITE UR QL STRIP: NEGATIVE
NON-SQ EPI CELLS URNS QL MICRO: ABNORMAL /HPF
NRBC BLD AUTO-RTO: 0 /100 WBCS
PH UR STRIP.AUTO: 5.5 [PH]
PLATELET # BLD AUTO: 339 THOUSANDS/UL (ref 149–390)
PMV BLD AUTO: 9.9 FL (ref 8.9–12.7)
PROT UR STRIP-MCNC: NEGATIVE MG/DL
RBC # BLD AUTO: 4.54 MILLION/UL (ref 3.81–5.12)
RBC #/AREA URNS AUTO: ABNORMAL /HPF
RH BLD: POSITIVE
SP GR UR STRIP.AUTO: 1.01 (ref 1–1.03)
SPECIMEN EXPIRATION DATE: NORMAL
UROBILINOGEN UR STRIP-ACNC: <2 MG/DL
WBC # BLD AUTO: 8.58 THOUSAND/UL (ref 4.31–10.16)
WBC #/AREA URNS AUTO: ABNORMAL /HPF

## 2023-07-15 PROCEDURE — 86850 RBC ANTIBODY SCREEN: CPT

## 2023-07-15 PROCEDURE — 81001 URINALYSIS AUTO W/SCOPE: CPT

## 2023-07-15 PROCEDURE — 86900 BLOOD TYPING SEROLOGIC ABO: CPT

## 2023-07-15 PROCEDURE — 86803 HEPATITIS C AB TEST: CPT

## 2023-07-15 PROCEDURE — 87389 HIV-1 AG W/HIV-1&-2 AB AG IA: CPT

## 2023-07-15 PROCEDURE — 85025 COMPLETE CBC W/AUTO DIFF WBC: CPT

## 2023-07-15 PROCEDURE — 87340 HEPATITIS B SURFACE AG IA: CPT

## 2023-07-15 PROCEDURE — 86780 TREPONEMA PALLIDUM: CPT

## 2023-07-15 PROCEDURE — 87086 URINE CULTURE/COLONY COUNT: CPT

## 2023-07-15 PROCEDURE — 86901 BLOOD TYPING SEROLOGIC RH(D): CPT

## 2023-07-15 PROCEDURE — 36415 COLL VENOUS BLD VENIPUNCTURE: CPT

## 2023-07-15 PROCEDURE — 86762 RUBELLA ANTIBODY: CPT

## 2023-07-16 LAB
HBV SURFACE AG SER QL: NORMAL
HCV AB SER QL: NORMAL
HIV 1+2 AB+HIV1 P24 AG SERPL QL IA: NORMAL
HIV 2 AB SERPL QL IA: NORMAL
HIV1 AB SERPL QL IA: NORMAL
HIV1 P24 AG SERPL QL IA: NORMAL
RUBV IGG SERPL IA-ACNC: 50.3 IU/ML
TREPONEMA PALLIDUM IGG+IGM AB [PRESENCE] IN SERUM OR PLASMA BY IMMUNOASSAY: NORMAL

## 2023-07-17 LAB — BACTERIA UR CULT: ABNORMAL

## 2023-07-27 PROBLEM — Z01.419 ENCOUNTER FOR ANNUAL ROUTINE GYNECOLOGICAL EXAMINATION: Status: RESOLVED | Noted: 2021-04-23 | Resolved: 2023-07-27

## 2023-07-28 ENCOUNTER — INITIAL PRENATAL (OUTPATIENT)
Dept: OBGYN CLINIC | Facility: MEDICAL CENTER | Age: 28
End: 2023-07-28

## 2023-07-28 VITALS
BODY MASS INDEX: 23.79 KG/M2 | DIASTOLIC BLOOD PRESSURE: 70 MMHG | WEIGHT: 126 LBS | HEIGHT: 61 IN | SYSTOLIC BLOOD PRESSURE: 106 MMHG

## 2023-07-28 DIAGNOSIS — Z3A.09 9 WEEKS GESTATION OF PREGNANCY: ICD-10-CM

## 2023-07-28 DIAGNOSIS — Z11.3 SCREEN FOR STD (SEXUALLY TRANSMITTED DISEASE): ICD-10-CM

## 2023-07-28 DIAGNOSIS — Z34.01 ENCOUNTER FOR SUPERVISION OF NORMAL FIRST PREGNANCY IN FIRST TRIMESTER: Primary | ICD-10-CM

## 2023-07-28 PROCEDURE — 87491 CHLMYD TRACH DNA AMP PROBE: CPT | Performed by: CLINICAL NURSE SPECIALIST

## 2023-07-28 PROCEDURE — 87591 N.GONORRHOEAE DNA AMP PROB: CPT | Performed by: CLINICAL NURSE SPECIALIST

## 2023-07-28 NOTE — PROGRESS NOTES
Prenatal Visit  Subjective:   Lizz Monique is a 29 y.o. female. She is a  here for initial PN visit/New OB exam    She is reporting the following pregnancy related complaints:  • Still having some N/V- got worse after US appt but now is improving  • Denies cramping or VB  • Denies abnormal d/c or urinary symptoms      The following portions of the patient's history were reviewed and updated as appropriate: allergies, current medications, past family history, past medical history, past social history, past surgical history, and problem list.    Genetic Family hx: unremarkable    Diabetes risk Factors:   + FH     Hypertension Risk Factors:   Pertinent positive: nulliparity      Objective:  Patient's last menstrual period was 2023. /70 (BP Location: Left arm, Patient Position: Sitting, Cuff Size: Standard)   Ht 5' 1" (1.549 m)   Wt 57.2 kg (126 lb)   LMP 2023   BMI 23.81 kg/m²   Pregravid Weight/BMI: 54 kg (119 lb) (BMI 22.50)      OBGyn Exam- see prenatal physical form    Assessment & Plan:  .  1. Encounter for supervision of normal first pregnancy in first trimester  -     POCT urine dip    2. 9 weeks gestation of pregnancy  Assessment & Plan:  She is a  at 12w4d  New OB Exam completed -and WNL  Pap is not indicated and gonorrhea/chlamydia cultures collected. Anticipate low risk pregnancy    I reviewed the expected course of the pregnancy with visits, labs and additional testing. The patient has obtained her prenatal labs. Genetic screening/testing options again reviewed and she elects for NONE- does not want genetic screening/testing     I have reviewed the maternal as well as infant benefits of breastfeeding with the patient. The patient currently plans to breastfeed. I have reviewed proper nutrition in pregnancy as well as exercise and safe medications that can be used for various common symptoms in pregnancy.     I reviewed the reasons to call in the first trimester - namely vaginal bleeding, severe nausea and vomiting, severe pelvic pain, fevers or pain/burning with urination. She was already previously referred to [facility] for NT US and will scheduled for level 2 G&A for 20-21 wks after that appt. I recommended that the patient receive the covid vaccine if not already done and to receive the flu vaccine during flu season. All questions were answered to her satisfaction      3.  Screen for STD (sexually transmitted disease)  -     Chlamydia/GC amplified DNA by DIAMOND Meyer  7/28/2023

## 2023-07-28 NOTE — ASSESSMENT & PLAN NOTE
She is a  at [address] [place] [address] OB Exam completed -and WNL  Pap is not indicated and gonorrhea/chlamydia cultures collected. Anticipate low risk pregnancy    I reviewed the expected course of the pregnancy with visits, labs and additional testing. The patient has obtained her prenatal labs. Genetic screening/testing options again reviewed and she elects for NONE- does not want genetic screening/testing     I have reviewed the maternal as well as infant benefits of breastfeeding with the patient. The patient currently plans to breastfeed. I have reviewed proper nutrition in pregnancy as well as exercise and safe medications that can be used for various common symptoms in pregnancy. I reviewed the reasons to call in the first trimester - namely vaginal bleeding, severe nausea and vomiting, severe pelvic pain, fevers or pain/burning with urination. She was already previously referred to [facility] for NT US and will scheduled for level 2 G&A for 20-21 wks after that appt. I recommended that the patient receive the covid vaccine if not already done and to receive the flu vaccine during flu season.       All questions were answered to her satisfaction

## 2023-07-28 NOTE — PROGRESS NOTES
Patient is here for prenatal ob visit today. No question's or concerns at this time. GA: Unknown  Estimated Date of Delivery: None noted.   Urine: Protein/ Glucose unable to void   Denies loss of fluid, vaginal bleeding and contractions. Good fetal movement.    Labs utd

## 2023-07-31 LAB
C TRACH DNA SPEC QL NAA+PROBE: NEGATIVE
N GONORRHOEA DNA SPEC QL NAA+PROBE: NEGATIVE

## 2023-08-04 ENCOUNTER — ROUTINE PRENATAL (OUTPATIENT)
Dept: PERINATAL CARE | Facility: OTHER | Age: 28
End: 2023-08-04
Payer: COMMERCIAL

## 2023-08-04 VITALS
BODY MASS INDEX: 23.98 KG/M2 | WEIGHT: 127 LBS | HEART RATE: 71 BPM | HEIGHT: 61 IN | SYSTOLIC BLOOD PRESSURE: 110 MMHG | DIASTOLIC BLOOD PRESSURE: 74 MMHG

## 2023-08-04 DIAGNOSIS — Z3A.13 13 WEEKS GESTATION OF PREGNANCY: Primary | ICD-10-CM

## 2023-08-04 DIAGNOSIS — Z36.82 ENCOUNTER FOR (NT) NUCHAL TRANSLUCENCY SCAN: ICD-10-CM

## 2023-08-04 DIAGNOSIS — N91.2 AMENORRHEA: ICD-10-CM

## 2023-08-04 PROCEDURE — 76813 OB US NUCHAL MEAS 1 GEST: CPT | Performed by: OBSTETRICS & GYNECOLOGY

## 2023-08-04 NOTE — PROGRESS NOTES
Patient chose to have [facility] Non-invasive Prenatal Screen with fetal sex. Patient given brochure and is aware [facility] will contact their insurance and coordinate coverage. Patient made aware she will need to respond to text message or e-mail from [address] within 2 business days or testing will be run through insurance. Patient informed text message will come from area code  "415". Provided [employer] # [phone] and web site : [email or web address].   "[place] your test online" card with barcode and test tube ID provided to patient. Reviewed [employer]'s web site states 5-7 business days for results via their portal.   [employer] message will be sent to patient when [facility] receives results /provider reviews. 2 vials of blood drawn from  left  arm by GUS Puente RN. Patient tolerated blood draw without difficulty. Specimens labeled with patient identifiers (name, date of birth, specimen collection date), order and specimen were verified with patient, packed and sent via [address]. [employer]  tracking #  [id number]  Copy of lab order scanned to Epic media. Maternal Fetal Medicine will have results in approximately 7-10 business days and will call patient or notify via [address] [place] [address]. Patient aware viewing lab result online will reveal fetal sex if ordered. Patient verbalized understanding of all instructions and no questions at this time.

## 2023-08-04 NOTE — PATIENT INSTRUCTIONS
You elected to have non-invasive prenatal screening (NIPS). This involves a blood test to check for the four most common genetic syndromes (Trisomy 21, Trisomy 13, Trisomy 25, and sex chromosome abnormalities). It also MAY report the biologic sex of the fetus if you opted to learn this information. You can call our office to verbally review results to avoid inadvertently learning this information via [employer] [place], if desired. Results will be visible in your [employer] portal 7-10 business days from when the test is drawn. Please follow all instructions regarding insurance cost/coverage provided to you today. Please contact our office with any concerns or questions. You will need spina bifida screening (called MSAFP) for the baby beginning at 15 weeks gestation, which will be ordered by your obstetrician's office. This test allows for earlier detection of spina bifida than is possible by ultrasound, and is advised in all pregnancies.

## 2023-08-04 NOTE — PROGRESS NOTES
[address]: Keiko Bess was seen today for nuchal translucency ultrasound. See ultrasound report under "OB Procedures" tab. MDM:   I. Diagnoses/Problems addressed:  aneuploidy screening in pregnancy  II. Data: I ordered the following tests: NIPS. III.   Risk of morbidity: Low    Please don't hesitate to contact our office with any concerns or questions.  -Neelima Mckenna MD

## 2023-08-22 PROBLEM — Z3A.16 16 WEEKS GESTATION OF PREGNANCY: Status: ACTIVE | Noted: 2023-06-30

## 2023-08-22 PROBLEM — Z34.02 ENCOUNTER FOR SUPERVISION OF NORMAL FIRST PREGNANCY IN SECOND TRIMESTER: Status: ACTIVE | Noted: 2023-08-22

## 2023-08-23 ENCOUNTER — APPOINTMENT (OUTPATIENT)
Dept: LAB | Facility: MEDICAL CENTER | Age: 28
End: 2023-08-23
Payer: COMMERCIAL

## 2023-08-23 ENCOUNTER — ROUTINE PRENATAL (OUTPATIENT)
Dept: OBGYN CLINIC | Facility: MEDICAL CENTER | Age: 28
End: 2023-08-23

## 2023-08-23 VITALS
DIASTOLIC BLOOD PRESSURE: 70 MMHG | HEART RATE: 92 BPM | HEIGHT: 61 IN | SYSTOLIC BLOOD PRESSURE: 104 MMHG | WEIGHT: 126.8 LBS | BODY MASS INDEX: 23.94 KG/M2

## 2023-08-23 DIAGNOSIS — M54.30 SCIATIC LEG PAIN: ICD-10-CM

## 2023-08-23 DIAGNOSIS — Z33.1 PREGNANT STATE, INCIDENTAL: ICD-10-CM

## 2023-08-23 DIAGNOSIS — Z3A.16 16 WEEKS GESTATION OF PREGNANCY: ICD-10-CM

## 2023-08-23 DIAGNOSIS — Z34.02 ENCOUNTER FOR SUPERVISION OF NORMAL FIRST PREGNANCY IN SECOND TRIMESTER: Primary | ICD-10-CM

## 2023-08-23 DIAGNOSIS — Z36.9 UNSPECIFIED ANTENATAL SCREENING: ICD-10-CM

## 2023-08-23 PROCEDURE — PNV: Performed by: NURSE PRACTITIONER

## 2023-08-23 PROCEDURE — 36415 COLL VENOUS BLD VENIPUNCTURE: CPT

## 2023-08-23 PROCEDURE — 82105 ALPHA-FETOPROTEIN SERUM: CPT

## 2023-08-23 NOTE — PROGRESS NOTES
Problem   Sciatic Leg Pain   16 Weeks Gestation of Pregnancy     16 weeks gestation of pregnancy  Derek Martinez is a 29 y.o.  here for OB visit at 16w2d weeks accompanied by  Ismael Griffith. TWG  3.538 kg (7 lb 12.8 oz). No health concerns. Denies LOF, vaginal bleeding or contractions. AFP ordered today and plans to go directly to lab. Continue daily PNV  MFM appt 23 MFM report. AGA 13.1 week. Normal fetal growth. JESUS WNL. Anterior placenta. NT WNL. Unable to provide urine sample today. RTO in 4 weeks. Sciatic leg pain  Intermittent and radiates down right hamstring. Shown sciatic stretches. Rates pain at worst 7/10. Now 0/0. Avoid trigger positions.    Call with any worsening and will consider PT referral.

## 2023-08-23 NOTE — PATIENT INSTRUCTIONS
Please review our Pregnancy Essential Guide and [facility] L&D Virtual tour from our [employer]. . [employer] Pregnancy Essentials Guide  [facility] ([address] [facility])     [address] [facility] (Adonay [place]. [place])

## 2023-08-23 NOTE — ASSESSMENT & PLAN NOTE
Vicky Rodrigez is a 29 y.o.  here for OB visit at 16w2d weeks accompanied by  Zak Gar. TWG  3.538 kg (7 lb 12.8 oz). No health concerns. Denies LOF, vaginal bleeding or contractions. AFP ordered today and plans to go directly to lab. Continue daily PNV  MFM appt 23 MFM report. AGA 13.1 week. Normal fetal growth. JESUS WNL. Anterior placenta. NT WNL. Unable to provide urine sample today. RTO in 4 weeks.

## 2023-08-23 NOTE — ASSESSMENT & PLAN NOTE
Intermittent and radiates down right hamstring. Shown sciatic stretches. Rates pain at worst 7/10. Now 0/0. Avoid trigger positions.    Call with any worsening and will consider PT referral.

## 2023-08-25 LAB
2ND TRIMESTER 4 SCREEN SERPL-IMP: NORMAL
AFP ADJ MOM SERPL: 0.98
AFP INTERP AMN-IMP: NORMAL
AFP INTERP SERPL-IMP: NORMAL
AFP INTERP SERPL-IMP: NORMAL
AFP SERPL-MCNC: 39.5 NG/ML
AGE AT DELIVERY: 28.8 YR
GA METHOD: NORMAL
GA: 16.3 WEEKS
IDDM PATIENT QL: NO
MULTIPLE PREGNANCY: NO
NEURAL TUBE DEFECT RISK FETUS: NORMAL %

## 2023-09-21 PROBLEM — Z3A.20 20 WEEKS GESTATION OF PREGNANCY: Status: ACTIVE | Noted: 2023-06-30

## 2023-09-22 ENCOUNTER — ROUTINE PRENATAL (OUTPATIENT)
Dept: OBGYN CLINIC | Facility: MEDICAL CENTER | Age: 28
End: 2023-09-22
Payer: COMMERCIAL

## 2023-09-22 VITALS
DIASTOLIC BLOOD PRESSURE: 70 MMHG | WEIGHT: 134 LBS | HEART RATE: 70 BPM | SYSTOLIC BLOOD PRESSURE: 108 MMHG | BODY MASS INDEX: 25.3 KG/M2 | HEIGHT: 61 IN

## 2023-09-22 DIAGNOSIS — Z34.02 ENCOUNTER FOR SUPERVISION OF NORMAL FIRST PREGNANCY IN SECOND TRIMESTER: Primary | ICD-10-CM

## 2023-09-22 DIAGNOSIS — Z3A.20 20 WEEKS GESTATION OF PREGNANCY: ICD-10-CM

## 2023-09-22 LAB
SL AMB  POCT GLUCOSE, UA: NEGATIVE
SL AMB POCT URINE PROTEIN: NEGATIVE

## 2023-09-22 PROCEDURE — PNV: Performed by: NURSE PRACTITIONER

## 2023-09-22 PROCEDURE — 81002 URINALYSIS NONAUTO W/O SCOPE: CPT | Performed by: NURSE PRACTITIONER

## 2023-09-22 NOTE — ASSESSMENT & PLAN NOTE
Deedee Donaldson is a 29 y.o. Wadie Nan here for OB visit at 20w4d weeks accompanied by Tushar Wylie. TWG  6.804 kg (15 lb). 7 lb weight gain since last visit. Admits to eating more yet healthy. Will monitor. No health concerns. Denies LOF, vaginal bleeding or contractions. AFP screen in negative  Continue daily PNV  Influenza vaccine recommended  Plans to get covid vaccine. MFM appt 9/29/23  RTO in 4 weeks.

## 2023-09-22 NOTE — PROGRESS NOTES
Problem   20 Weeks Gestation of Pregnancy     20 weeks gestation of pregnancy  Derek Martinez is a 29 y.o. Kalie Lien here for OB visit at 20w4d weeks accompanied by Ismael Griffith. TWG  6.804 kg (15 lb). 7 lb weight gain since last visit. Admits to eating more yet healthy. Will monitor. No health concerns. Denies LOF, vaginal bleeding or contractions. AFP screen in negative  Continue daily PNV  Influenza vaccine recommended  Plans to get covid vaccine. MFM appt 9/29/23  RTO in 4 weeks.

## 2023-09-29 ENCOUNTER — TELEPHONE (OUTPATIENT)
Dept: PERINATAL CARE | Facility: CLINIC | Age: 28
End: 2023-09-29

## 2023-09-29 ENCOUNTER — ROUTINE PRENATAL (OUTPATIENT)
Dept: PERINATAL CARE | Facility: OTHER | Age: 28
End: 2023-09-29
Payer: COMMERCIAL

## 2023-09-29 VITALS
HEIGHT: 60 IN | BODY MASS INDEX: 25.72 KG/M2 | SYSTOLIC BLOOD PRESSURE: 90 MMHG | HEART RATE: 92 BPM | DIASTOLIC BLOOD PRESSURE: 50 MMHG | WEIGHT: 131 LBS

## 2023-09-29 DIAGNOSIS — Z36.3 ENCOUNTER FOR ANTENATAL SCREENING FOR MALFORMATIONS: ICD-10-CM

## 2023-09-29 DIAGNOSIS — Z36.86 ENCOUNTER FOR ANTENATAL SCREENING FOR CERVICAL LENGTH: ICD-10-CM

## 2023-09-29 DIAGNOSIS — O35.9XX0 SUSPECTED FETAL ANOMALY, ANTEPARTUM, SINGLE OR UNSPECIFIED FETUS: Primary | ICD-10-CM

## 2023-09-29 DIAGNOSIS — Z3A.21 21 WEEKS GESTATION OF PREGNANCY: ICD-10-CM

## 2023-09-29 PROCEDURE — 76811 OB US DETAILED SNGL FETUS: CPT | Performed by: OBSTETRICS & GYNECOLOGY

## 2023-09-29 PROCEDURE — 76817 TRANSVAGINAL US OBSTETRIC: CPT | Performed by: OBSTETRICS & GYNECOLOGY

## 2023-09-29 PROCEDURE — 99213 OFFICE O/P EST LOW 20 MIN: CPT | Performed by: OBSTETRICS & GYNECOLOGY

## 2023-09-29 NOTE — TELEPHONE ENCOUNTER
----- Message from Ana Cristina Pennington MD sent at 9/29/2023  2:38 PM EDT -----  Please see my note from today. Patient and her  are having problems paying their NIPT pill to [facility].     Yolande Bhat

## 2023-09-29 NOTE — TELEPHONE ENCOUNTER
Spoke with Trina Phillips, she states she received both e-mail and text message from [facility] regarding her OOP cost for NIPT but is having an issue paying her bill on portal.  Patient given the [address] [facility] # [phone] and she has the web site information. She states she will reach out to [facility] and has MFM # if she has any additional questions.

## 2023-09-29 NOTE — PROGRESS NOTES
Rafia Barillas  has no complaints today at 21w4d. She is not sure she feels fetal movements yet and does not report any vaginal bleeding or signs of labor. Her recently completed fetal testing revealed a normal NIPT and MSAFP. She is here today for an ultrasound for anatomy. Ultrasound findings: The ultrasound today shows normal interval fetal growth and fluid, normal cervical length, and no malformations were detected. Views of the left foot during the scan Jose Franc 80,15,250) showed the pinky toe on a different plane as the other 4 toes which I think is a normal variant as there are other views that appear normal.  My pictures do not suggest an extra digit on today's scan. Pregnancy ultrasound has limitations and is unable to detect all forms of fetal congenital abnormalities. Follow up recommended:   Recommend a follow-up ultrasound at 28 weeks for growth and for better pictures of the left foot. Pre visit time reviewing her records   5 minutes  Face to face time 5 minutes  Post visit time on documentation of note, updating her problem list, adding orders and prescriptions 5 minutes. Procedures that were completed today were charged separately. The level of decision making was straight forward.     Hollie Lamb MD

## 2023-09-29 NOTE — LETTER
September 29, 2023     Naeem Arita, [address] [address]    Patient: Annie Garcia   YOB: 1995   Date of Visit: 9/29/2023       DearBeatrice Abel: Thank you for referring Annie Garcia to me for evaluation. Below are my notes for this consultation. If you have questions, please do not hesitate to call me. I look forward to following your patient along with you. Sincerely,        Jaelyn Coleman MD        CC: No Recipients    Jaelyn Coleman MD  9/29/2023  5:41 PM  Sign when Signing Visit  Annie Garcia  has no complaints today at 21w4d. She is not sure she feels fetal movements yet and does not report any vaginal bleeding or signs of labor. Her recently completed fetal testing revealed a normal NIPT and MSAFP. She is here today for an ultrasound for anatomy. Ultrasound findings: The ultrasound today shows normal interval fetal growth and fluid, normal cervical length, and no malformations were detected. Views of the left foot during the scan Jamel Crump 78,43,343) showed the pinky toe on a different plane as the other 4 toes which I think is a normal variant as there are other views that appear normal.  My pictures do not suggest an extra digit on today's scan. Pregnancy ultrasound has limitations and is unable to detect all forms of fetal congenital abnormalities. Follow up recommended:   Recommend a follow-up ultrasound at 28 weeks for growth and for better pictures of the left foot. Pre visit time reviewing her records   5 minutes  Face to face time 5 minutes  Post visit time on documentation of note, updating her problem list, adding orders and prescriptions 5 minutes. Procedures that were completed today were charged separately. The level of decision making was straight forward.     Jaelyn Coleman MD

## 2023-10-20 ENCOUNTER — ROUTINE PRENATAL (OUTPATIENT)
Dept: OBGYN CLINIC | Facility: MEDICAL CENTER | Age: 28
End: 2023-10-20
Payer: COMMERCIAL

## 2023-10-20 VITALS
SYSTOLIC BLOOD PRESSURE: 118 MMHG | DIASTOLIC BLOOD PRESSURE: 68 MMHG | HEIGHT: 60 IN | BODY MASS INDEX: 27.09 KG/M2 | WEIGHT: 138 LBS

## 2023-10-20 DIAGNOSIS — Z34.02 ENCOUNTER FOR SUPERVISION OF NORMAL FIRST PREGNANCY IN SECOND TRIMESTER: Primary | ICD-10-CM

## 2023-10-20 DIAGNOSIS — Z3A.24 24 WEEKS GESTATION OF PREGNANCY: ICD-10-CM

## 2023-10-20 LAB
SL AMB  POCT GLUCOSE, UA: NORMAL
SL AMB POCT URINE PROTEIN: NORMAL

## 2023-10-20 PROCEDURE — 81002 URINALYSIS NONAUTO W/O SCOPE: CPT | Performed by: CLINICAL NURSE SPECIALIST

## 2023-10-20 PROCEDURE — PNV: Performed by: CLINICAL NURSE SPECIALIST

## 2023-10-20 NOTE — ASSESSMENT & PLAN NOTE
24w4d  Doing well, reports good FM  Did get Covid vaccine-last week. Had a low grade fever. Agreeable to flu vaccine- but next visit. Reviewed the following today:  S/S PTL including to call if having >  4-6 ctx in an hour. S/S HTN disorders in pregnancy such as pre-eclampsia  Orders for routine 3rd trimester bloodwork given today -to be done around 26-28 wks- see ordered  Tdap vaccine- to be given to all pregnant women in the 3rd trimester (27-36 weeks) of each pregnancy in order to protect against pertussis. It is also recommended that anyone who is going to have close contact with the baby also get the vaccine at their health care provider.

## 2023-10-20 NOTE — PROGRESS NOTES
Prenatal Visit  Subjective:   [facility] is a 29 y.o. Kalie Cates 24w4d here for Routine Prenatal Visit (Jay 2/5/O+/28 week labs ordered /Flu vaccine- next visit )    Denies unusual vaginal discharge, LOF, VB, or ctx. Reports Fetus is active. Did get Covid vaccine-last week. Had a low grade fever. Agreeable to flu vaccine- but next visit. Planning to take some prenatal classes    Objective:  Vitals:    10/20/23 0951   BP: 118/68     Pregravid Weight/BMI: 54 kg (119 lb) (BMI 23.24)  Current Weight: 62.6 kg (138 lb)   Total Weight Gain: 8.618 kg (19 lb)     Fetal Heart Rate: 145  Fundal Height (cm): 25 cm    OBGyn Exam  Physical Exam  Fetal Heart Rate: 145 , Fundal Height (cm): 25 cm  General: Not in acute distress and appearing well nourished and well groomed  Genitourinary:          Pelvic exam exam deferred   Cardiovascular:   Rate and Rhythm: Normal rate. Pulmonary:    Effort: normal, not labored  Abdominal:  Abdomen is soft and gravid    Musculoskeletal: Active movement of all extremities, no gross limitations of ROM     Edema: None  Neurological:   Mental Status: She is alert and oriented to person, place, and time. Skin: General: Skin is warm and dry. Psychiatric:    Mood and Affect: Mood normal.      Behavior: Behavior normal      Assessment & Plan:        1. Encounter for supervision of normal first pregnancy in second trimester  -     Anemia Panel w/Reflex, OB; Future; Expected date: 10/20/2023  -     CBC and differential; Future  -     RPR-Syphilis Screening (Total Syphilis IGG/IGM); Future  -     Glucose, 1H PG; Future  -     POCT urine dip    2. 24 weeks gestation of pregnancy  Assessment & Plan:    24w4d  Doing well, reports good FM  Did get Covid vaccine-last week. Had a low grade fever. Agreeable to flu vaccine- but next visit. Reviewed the following today:  S/S PTL including to call if having >  4-6 ctx in an hour.    S/S HTN disorders in pregnancy such as pre-eclampsia  Orders for routine 3rd trimester bloodwork given today -to be done around 26-28 wks- see ordered  Tdap vaccine- to be given to all pregnant women in the 3rd trimester (27-36 weeks) of each pregnancy in order to protect against pertussis. It is also recommended that anyone who is going to have close contact with the baby also get the vaccine at their health care provider.           DIAMOND Herbert  10/20/2023

## 2023-11-03 ENCOUNTER — TELEPHONE (OUTPATIENT)
Dept: PERINATAL CARE | Facility: OTHER | Age: 28
End: 2023-11-03

## 2023-11-03 ENCOUNTER — APPOINTMENT (OUTPATIENT)
Dept: LAB | Facility: HOSPITAL | Age: 28
End: 2023-11-03
Payer: COMMERCIAL

## 2023-11-03 DIAGNOSIS — Z34.02 ENCOUNTER FOR SUPERVISION OF NORMAL FIRST PREGNANCY IN SECOND TRIMESTER: ICD-10-CM

## 2023-11-03 LAB
BASOPHILS # BLD AUTO: 0.04 THOUSANDS/ÂΜL (ref 0–0.1)
BASOPHILS NFR BLD AUTO: 0 % (ref 0–1)
EOSINOPHIL # BLD AUTO: 0.07 THOUSAND/ÂΜL (ref 0–0.61)
EOSINOPHIL NFR BLD AUTO: 1 % (ref 0–6)
ERYTHROCYTE [DISTWIDTH] IN BLOOD BY AUTOMATED COUNT: 12.9 % (ref 11.6–15.1)
GLUCOSE 1H P 50 G GLC PO SERPL-MCNC: 84 MG/DL (ref 40–134)
HCT VFR BLD AUTO: 37.1 % (ref 34.8–46.1)
HGB BLD-MCNC: 12.3 G/DL (ref 11.5–15.4)
IMM GRANULOCYTES # BLD AUTO: 0.09 THOUSAND/UL (ref 0–0.2)
IMM GRANULOCYTES NFR BLD AUTO: 1 % (ref 0–2)
LYMPHOCYTES # BLD AUTO: 1.41 THOUSANDS/ÂΜL (ref 0.6–4.47)
LYMPHOCYTES NFR BLD AUTO: 13 % (ref 14–44)
MCH RBC QN AUTO: 30.1 PG (ref 26.8–34.3)
MCHC RBC AUTO-ENTMCNC: 33.2 G/DL (ref 31.4–37.4)
MCV RBC AUTO: 91 FL (ref 82–98)
MONOCYTES # BLD AUTO: 0.68 THOUSAND/ÂΜL (ref 0.17–1.22)
MONOCYTES NFR BLD AUTO: 6 % (ref 4–12)
NEUTROPHILS # BLD AUTO: 9 THOUSANDS/ÂΜL (ref 1.85–7.62)
NEUTS SEG NFR BLD AUTO: 79 % (ref 43–75)
NRBC BLD AUTO-RTO: 0 /100 WBCS
PLATELET # BLD AUTO: 289 THOUSANDS/UL (ref 149–390)
PMV BLD AUTO: 9.4 FL (ref 8.9–12.7)
RBC # BLD AUTO: 4.08 MILLION/UL (ref 3.81–5.12)
TREPONEMA PALLIDUM IGG+IGM AB [PRESENCE] IN SERUM OR PLASMA BY IMMUNOASSAY: NORMAL
WBC # BLD AUTO: 11.29 THOUSAND/UL (ref 4.31–10.16)

## 2023-11-03 PROCEDURE — 36415 COLL VENOUS BLD VENIPUNCTURE: CPT

## 2023-11-03 PROCEDURE — 86780 TREPONEMA PALLIDUM: CPT

## 2023-11-03 PROCEDURE — 85025 COMPLETE CBC W/AUTO DIFF WBC: CPT

## 2023-11-03 PROCEDURE — 82950 GLUCOSE TEST: CPT

## 2023-11-03 NOTE — TELEPHONE ENCOUNTER
SILVERM 11/3 at 11am notifying patient that [facility] is closed 11/10 so her appt will be r/s. Holding 11/9 at [phone] until patient returns call to confirm appt.

## 2023-11-09 ENCOUNTER — ULTRASOUND (OUTPATIENT)
Dept: PERINATAL CARE | Facility: OTHER | Age: 28
End: 2023-11-09
Payer: COMMERCIAL

## 2023-11-09 VITALS
BODY MASS INDEX: 28.86 KG/M2 | HEART RATE: 86 BPM | WEIGHT: 147 LBS | DIASTOLIC BLOOD PRESSURE: 64 MMHG | SYSTOLIC BLOOD PRESSURE: 104 MMHG | HEIGHT: 60 IN

## 2023-11-09 DIAGNOSIS — Z36.89 ENCOUNTER FOR ULTRASOUND TO ASSESS FETAL GROWTH: ICD-10-CM

## 2023-11-09 DIAGNOSIS — Z3A.27 27 WEEKS GESTATION OF PREGNANCY: ICD-10-CM

## 2023-11-09 DIAGNOSIS — O35.9XX0 SUSPECTED FETAL ANOMALY, ANTEPARTUM, SINGLE OR UNSPECIFIED FETUS: Primary | ICD-10-CM

## 2023-11-09 DIAGNOSIS — Z36.2 ENCOUNTER FOR FOLLOW-UP ULTRASOUND OF FETAL ANATOMY: ICD-10-CM

## 2023-11-09 PROBLEM — N92.6 IRREGULAR MENSES: Status: RESOLVED | Noted: 2021-04-23 | Resolved: 2023-11-09

## 2023-11-09 PROCEDURE — 76816 OB US FOLLOW-UP PER FETUS: CPT | Performed by: OBSTETRICS & GYNECOLOGY

## 2023-11-09 PROCEDURE — 99212 OFFICE O/P EST SF 10 MIN: CPT | Performed by: OBSTETRICS & GYNECOLOGY

## 2023-11-09 NOTE — LETTER
November 9, 2023     [place] [id number] [facility] [address] [address]    Patient: Daina Singh   YOB: 1995   Date of Visit: 11/9/2023       Dear Dr. Betsey Abebe: Thank you for referring Daina Singh to me for evaluation. Below are my notes for this consultation. If you have questions, please do not hesitate to call me. I look forward to following your patient along with you. Sincerely,        Gautam eBll MD        CC: No Recipients    Gautam Bell MD  11/9/2023  1:03 PM  Sign when Signing Visit  [address]: Daina Singh was seen today at 27w3d for fetal growth and followup missed anatomy ultrasound. See ultrasound report under "OB Procedures" tab.   Please don't hesitate to contact our office with any concerns or questions.  -Gautam Bell MD

## 2023-11-09 NOTE — PROGRESS NOTES
[address]: Karely Dennis was seen today at 27w3d for fetal growth and followup missed anatomy ultrasound. See ultrasound report under "OB Procedures" tab.   Please don't hesitate to contact our office with any concerns or questions.  -Irineo Oshea MD

## 2023-11-10 ENCOUNTER — TELEPHONE (OUTPATIENT)
Dept: OBGYN CLINIC | Facility: CLINIC | Age: 28
End: 2023-11-10

## 2023-11-10 NOTE — TELEPHONE ENCOUNTER
Noted- see she was schedule for 12/1 vs 11/28 appointment she cx  Per chart had 28 wks labs done already

## 2023-11-10 NOTE — TELEPHONE ENCOUNTER
Documenting that patient canceled 28 week appointment on 11/28 - patient had to move around her mfm appointment so cannot get off work for her routine visit - we have no appointments available to reschedule her

## 2023-12-01 ENCOUNTER — ROUTINE PRENATAL (OUTPATIENT)
Dept: OBGYN CLINIC | Facility: MEDICAL CENTER | Age: 28
End: 2023-12-01
Payer: COMMERCIAL

## 2023-12-01 VITALS
HEART RATE: 98 BPM | SYSTOLIC BLOOD PRESSURE: 112 MMHG | BODY MASS INDEX: 30.04 KG/M2 | WEIGHT: 153 LBS | DIASTOLIC BLOOD PRESSURE: 68 MMHG | HEIGHT: 60 IN

## 2023-12-01 DIAGNOSIS — Z23 ENCOUNTER FOR IMMUNIZATION: Primary | ICD-10-CM

## 2023-12-01 DIAGNOSIS — Z34.03 ENCOUNTER FOR SUPERVISION OF NORMAL FIRST PREGNANCY IN THIRD TRIMESTER: ICD-10-CM

## 2023-12-01 DIAGNOSIS — Z3A.30 30 WEEKS GESTATION OF PREGNANCY: ICD-10-CM

## 2023-12-01 PROCEDURE — 90471 IMMUNIZATION ADMIN: CPT

## 2023-12-01 PROCEDURE — PNV: Performed by: CLINICAL NURSE SPECIALIST

## 2023-12-01 PROCEDURE — 90686 IIV4 VACC NO PRSV 0.5 ML IM: CPT

## 2023-12-01 NOTE — ASSESSMENT & PLAN NOTE
, 30w4d  Overall doing well, reporting good FM. Contraceptive options were reviewed including hormonal methods, both combination (pill, patch, vaginal ring) and progesterone-only (pill, Depo Provera, Implanon), intrauterine devices (Mirena, Paragard), barrier methods (condoms, diaphragm), and male and female sterilization. The mechanism, risks, benefits, and side effects of all methods were discussed. If planning to breastfeed would avoid estrogen containing products as this may affect milk supply. The patient was counseled about the labor process. She was counseled regarding potential reasons that she may need a  section including arrest of dilation/descent, non-reassuring fetal status, or worsening maternal status. She was counseled on the risks of  including bleeding, infection, and injury to surrounding structures including the bowel and bladder. She was counseled that there are medical and surgical methods to manage excessive postpartum bleeding. She was counseled that in the event of excessive blood loss, she may require blood transfusion which includes a small risk of blood borne diseases such as hepatitis and HIV. The patient is OK with receiving a blood transfusion if necessary. The patient had an opportunity to ask questions and signed consent. She was counseled about the possible need for operative delivery using the vacuum or forceps and the indications for doing so. She was counseled that there is a small risk of  complications including intracranial hemorrhage, lacerations, nerve damage or fracture as well as the increased risk for more severe maternal laceration. The patient signed consent. We again reviewed the S/S PTL and importance of consistent/regular FM.  Reviewed process for [facility] and encouraged pt to call with any decreases in fetal movement 2

## 2023-12-14 PROBLEM — Z34.03 ENCOUNTER FOR SUPERVISION OF NORMAL FIRST PREGNANCY IN THIRD TRIMESTER: Status: ACTIVE | Noted: 2023-08-22

## 2023-12-14 PROBLEM — Z3A.32 32 WEEKS GESTATION OF PREGNANCY: Status: ACTIVE | Noted: 2023-06-30

## 2023-12-15 ENCOUNTER — ROUTINE PRENATAL (OUTPATIENT)
Dept: OBGYN CLINIC | Facility: MEDICAL CENTER | Age: 28
End: 2023-12-15
Payer: COMMERCIAL

## 2023-12-15 VITALS
SYSTOLIC BLOOD PRESSURE: 116 MMHG | HEART RATE: 105 BPM | WEIGHT: 155.2 LBS | DIASTOLIC BLOOD PRESSURE: 74 MMHG | HEIGHT: 61 IN | BODY MASS INDEX: 29.3 KG/M2

## 2023-12-15 DIAGNOSIS — Z34.03 ENCOUNTER FOR SUPERVISION OF NORMAL FIRST PREGNANCY IN THIRD TRIMESTER: Primary | ICD-10-CM

## 2023-12-15 DIAGNOSIS — Z3A.32 32 WEEKS GESTATION OF PREGNANCY: ICD-10-CM

## 2023-12-15 PROCEDURE — 81002 URINALYSIS NONAUTO W/O SCOPE: CPT | Performed by: NURSE PRACTITIONER

## 2023-12-15 PROCEDURE — PNV: Performed by: NURSE PRACTITIONER

## 2023-12-15 NOTE — ASSESSMENT & PLAN NOTE
"Beatrice Perry is a 28 y.o.  here for OB visit at 32w4d weeks accompanied by FOB Colby. TWG 16.4 kg (36 lb 3.2 oz).  She has a \"cold\" and Colby has a URI. Both area masked and monitoring symptoms. Beatrice denies a fever.     Denies LOF, vaginal bleeding or contractions.   Performing FKC's daily 10 in 2 hours  28 week labs WNL  Influenza vaccine up to date  Covid vaccine up to date; last booster 10/23.   Desires abrysvo at next appt. TDAP also due.   Taking childbirth class soon.   Pediatric CPR class recommended  RTO 2 weeks  "

## 2023-12-15 NOTE — PATIENT INSTRUCTIONS
Kick Counts in Pregnancy   WHAT YOU NEED TO KNOW:   Kick counts measure how much your baby is moving in your womb. A kick from your baby can be felt as a twist, turn, swish, roll, or jab. It is common to feel your baby kicking at 26 to 28 weeks of pregnancy. You may feel your baby kick as early as 20 weeks of pregnancy. You may want to start counting at 28 weeks.   DISCHARGE INSTRUCTIONS:   Contact your doctor immediately if:   You feel a change in the number of kicks or movements of your baby.     You feel fewer than 10 kicks within 2 hours.     You have questions or concerns about your baby's movements.    Why measure kick counts:  Your baby's movement may provide information about your baby's health. He or she may move less, or not at all, if there are problems. Your baby may move less if he or she is not getting enough oxygen or nutrition from the placenta. Do not smoke while you are pregnant. Smoking decreases the amount of oxygen that gets to your baby. Talk to your healthcare provider if you need help to quit smoking. Tell your healthcare provider as soon as you feel a change in your baby's movements.  When to measure kick counts:   Measure kick counts at the same time every day.      Measure kick counts when your baby is awake and most active. Your baby may be most active in the evening.    How to measure kick counts:  Check that your baby is awake before you measure kick counts. You can wake up your baby by lightly pushing on your belly, walking, or drinking something cold. Your healthcare provider may tell you different ways to measure kick counts. You may be told to do the following:  Use a chart or clock to keep track of the time you start and finish counting.     Sit in a chair or lie on your left side.     Place your hands on the largest part of your belly.     Count until you reach 10 kicks. Write down how much time it takes to count 10 kicks.     It may take 30 minutes to 2 hours to count 10 kicks.  It should not take more than 2 hours to count 10 kicks.    Follow up with your doctor as directed:  Write down your questions so you remember to ask them during your visits.  © Copyright [employer] 2023 Information is for End User's use only and may not be sold, redistributed or otherwise used for commercial purposes.  The above information is an  only. It is not intended as medical advice for individual conditions or treatments. Talk to your doctor, nurse or pharmacist before following any medical regimen to see if it is safe and effective for you.

## 2023-12-15 NOTE — PROGRESS NOTES
"Problem   32 Weeks Gestation of Pregnancy    Covid vaccine early October  Flu vaccine 28 wk visit (x)       32 weeks gestation of pregnancy  Beatrice Perry is a 28 y.o.  here for OB visit at 32w4d weeks accompanied by FOB Colby. TWG 16.4 kg (36 lb 3.2 oz).  She has a \"cold\" and Colby has a URI. Both area masked and monitoring symptoms. Beatrice denies a fever.     Denies LOF, vaginal bleeding or contractions.   Performing FKC's daily 10 in 2 hours  28 week labs WNL  Influenza vaccine up to date  Covid vaccine up to date; last booster 10/23.   Desires abrysvo at next appt. TDAP also due.   Taking childbirth class soon.   Pediatric CPR class recommended  RTO 2 weeks    "

## 2023-12-18 LAB
SL AMB  POCT GLUCOSE, UA: NEGATIVE
SL AMB POCT URINE PROTEIN: NEGATIVE

## 2023-12-27 PROBLEM — Z3A.34 34 WEEKS GESTATION OF PREGNANCY: Status: ACTIVE | Noted: 2023-06-30

## 2023-12-27 NOTE — ASSESSMENT & PLAN NOTE
Beatrice Perry is a 28 y.o.  here for OB visit at 34w3d weeks. TWG 18.6 kg (41 lb).  No health concerns.     Denies LOF, vaginal bleeding or contractions.   Performing FKC's daily 10 in 2 hours  Perineal massage encouraged  GBS at next appt  TDAP received today  Abryvso desired next visit.   Influenza and covid vaccines are up to date  Currently taking her childbirth class  RTO 2 weeks

## 2023-12-28 ENCOUNTER — ROUTINE PRENATAL (OUTPATIENT)
Dept: OBGYN CLINIC | Facility: MEDICAL CENTER | Age: 28
End: 2023-12-28
Payer: COMMERCIAL

## 2023-12-28 VITALS
WEIGHT: 160 LBS | DIASTOLIC BLOOD PRESSURE: 72 MMHG | BODY MASS INDEX: 30.48 KG/M2 | HEART RATE: 110 BPM | SYSTOLIC BLOOD PRESSURE: 124 MMHG

## 2023-12-28 DIAGNOSIS — Z23 ENCOUNTER FOR IMMUNIZATION: ICD-10-CM

## 2023-12-28 DIAGNOSIS — Z3A.34 34 WEEKS GESTATION OF PREGNANCY: ICD-10-CM

## 2023-12-28 DIAGNOSIS — Z34.03 ENCOUNTER FOR SUPERVISION OF NORMAL FIRST PREGNANCY IN THIRD TRIMESTER: Primary | ICD-10-CM

## 2023-12-28 LAB
SL AMB  POCT GLUCOSE, UA: NORMAL
SL AMB POCT URINE PROTEIN: NORMAL

## 2023-12-28 PROCEDURE — PNV: Performed by: NURSE PRACTITIONER

## 2023-12-28 PROCEDURE — 90471 IMMUNIZATION ADMIN: CPT

## 2023-12-28 PROCEDURE — 81002 URINALYSIS NONAUTO W/O SCOPE: CPT | Performed by: NURSE PRACTITIONER

## 2023-12-28 PROCEDURE — 90715 TDAP VACCINE 7 YRS/> IM: CPT

## 2023-12-28 NOTE — PROGRESS NOTES
Problem   34 Weeks Gestation of Pregnancy    Covid vaccine early October  Flu vaccine 28 wk visit (x)       34 weeks gestation of pregnancy  Beatrice Perry is a 28 y.o.  here for OB visit at 34w3d weeks. TWG 18.6 kg (41 lb).  No health concerns.     Denies LOF, vaginal bleeding or contractions.   Performing FKC's daily 10 in 2 hours  Perineal massage encouraged  GBS at next appt  TDAP received today  Abryvso desired next visit.   Influenza and covid vaccines are up to date  Currently taking her childbirth class  RTO 2 weeks

## 2024-01-04 ENCOUNTER — TELEPHONE (OUTPATIENT)
Dept: OBGYN CLINIC | Facility: CLINIC | Age: 29
End: 2024-01-04

## 2024-01-04 NOTE — TELEPHONE ENCOUNTER
Small amount of fluid. On chair after sitting up.     Patient unsure if its amnoptic fluid urine or just vaginal discharge.    Recommended patient to don a pad, and watch over  the next 1-2 hours. If pad is still dry then she can go on about her day, if pad is saturated then she is to call back so we can evaluate further. All questions answered, patient verbalized understanding.

## 2024-01-11 PROBLEM — Z3A.36 36 WEEKS GESTATION OF PREGNANCY: Status: ACTIVE | Noted: 2023-06-30

## 2024-01-12 ENCOUNTER — ROUTINE PRENATAL (OUTPATIENT)
Dept: OBGYN CLINIC | Facility: MEDICAL CENTER | Age: 29
End: 2024-01-12
Payer: COMMERCIAL

## 2024-01-12 VITALS
DIASTOLIC BLOOD PRESSURE: 74 MMHG | WEIGHT: 160.2 LBS | HEART RATE: 112 BPM | HEIGHT: 61 IN | SYSTOLIC BLOOD PRESSURE: 114 MMHG | BODY MASS INDEX: 30.25 KG/M2

## 2024-01-12 DIAGNOSIS — Z34.03 ENCOUNTER FOR SUPERVISION OF NORMAL FIRST PREGNANCY IN THIRD TRIMESTER: Primary | ICD-10-CM

## 2024-01-12 DIAGNOSIS — Z3A.36 36 WEEKS GESTATION OF PREGNANCY: ICD-10-CM

## 2024-01-12 DIAGNOSIS — Z29.11 NEED FOR RSV IMMUNIZATION: ICD-10-CM

## 2024-01-12 DIAGNOSIS — N89.8 LEUKORRHEA: ICD-10-CM

## 2024-01-12 LAB
BV WHIFF TEST VAG QL: NORMAL
CLUE CELLS SPEC QL WET PREP: NORMAL
FERN TEST: NORMAL
PH SMN: NORMAL [PH]
SL AMB  POCT GLUCOSE, UA: NEGATIVE
SL AMB POCT URINE PROTEIN: NEGATIVE
SL AMB POCT WET MOUNT: NORMAL
T VAGINALIS VAG QL WET PREP: NORMAL
YEAST VAG QL WET PREP: NORMAL

## 2024-01-12 PROCEDURE — 90678 RSV VACC PREF BIVALENT IM: CPT | Performed by: NURSE PRACTITIONER

## 2024-01-12 PROCEDURE — 90471 IMMUNIZATION ADMIN: CPT | Performed by: NURSE PRACTITIONER

## 2024-01-12 PROCEDURE — 87150 DNA/RNA AMPLIFIED PROBE: CPT | Performed by: NURSE PRACTITIONER

## 2024-01-12 PROCEDURE — PNV: Performed by: NURSE PRACTITIONER

## 2024-01-12 PROCEDURE — 87210 SMEAR WET MOUNT SALINE/INK: CPT | Performed by: NURSE PRACTITIONER

## 2024-01-12 PROCEDURE — 81002 URINALYSIS NONAUTO W/O SCOPE: CPT | Performed by: NURSE PRACTITIONER

## 2024-01-12 NOTE — PROGRESS NOTES
Problem   Leukorrhea   36 Weeks Gestation of Pregnancy    Covid vaccine early October  Flu vaccine 28 wk visit (x)       36 weeks gestation of pregnancy  Beatrice Perry is a 28 y.o.  here for OB visit at 36w4d weeks accompanied by  Colby. TWG 18.6 kg (41 lb).  No health concerns.     Denies LOF, vaginal bleeding or contractions.   Performing FKC's daily 10 in 2 hours  Perineal massage started  GBS collected today  TDAP, influenza vaccine and covid are up to date  Abrysvo vaccine received today.   RTO 1 week    Leukorrhea  Admits to increased vaginal discharge.   She fears she was slightly incontinent of urine due to the amount.Denies urinary symptoms.   Normal wet mount and negative fern.   Significant lactobacillus.

## 2024-01-12 NOTE — ASSESSMENT & PLAN NOTE
Admits to increased vaginal discharge.   She fears she was slightly incontinent of urine due to the amount.Denies urinary symptoms.   Normal wet mount and negative fern.   Significant lactobacillus.

## 2024-01-12 NOTE — ASSESSMENT & PLAN NOTE
Beatrice Perry is a 28 y.o.  here for OB visit at 36w4d weeks accompanied by  Colby. TWG 18.6 kg (41 lb).  No health concerns.     Denies LOF, vaginal bleeding or contractions.   Performing FKC's daily 10 in 2 hours  Perineal massage started  GBS collected today  TDAP, influenza vaccine and covid are up to date  Abrysvo vaccine received today.   RTO 1 week

## 2024-01-12 NOTE — PROGRESS NOTES
RSV Vaccine   36w4d  VIS given  No vaccines allergies   1st time receiving RSV Vaccine  Pt. Tolerated well  Location Given: Rt. Deltoid  Pt waited 10-15mins post-vaccine-No symptoms reported    Given By: Bettina EGAN MA  LOT: [id number]  EXP: 02/2025  NDC: [id number]

## 2024-01-14 LAB — GP B STREP DNA SPEC QL NAA+PROBE: NEGATIVE

## 2024-01-18 PROBLEM — Z3A.37 37 WEEKS GESTATION OF PREGNANCY: Status: ACTIVE | Noted: 2023-06-30

## 2024-01-19 ENCOUNTER — ROUTINE PRENATAL (OUTPATIENT)
Dept: OBGYN CLINIC | Facility: MEDICAL CENTER | Age: 29
End: 2024-01-19
Payer: COMMERCIAL

## 2024-01-19 VITALS
WEIGHT: 164 LBS | HEIGHT: 61 IN | BODY MASS INDEX: 30.96 KG/M2 | DIASTOLIC BLOOD PRESSURE: 68 MMHG | HEART RATE: 105 BPM | SYSTOLIC BLOOD PRESSURE: 110 MMHG

## 2024-01-19 DIAGNOSIS — Z34.03 ENCOUNTER FOR SUPERVISION OF NORMAL FIRST PREGNANCY IN THIRD TRIMESTER: Primary | ICD-10-CM

## 2024-01-19 DIAGNOSIS — M54.30 SCIATIC LEG PAIN: ICD-10-CM

## 2024-01-19 DIAGNOSIS — Z3A.37 37 WEEKS GESTATION OF PREGNANCY: ICD-10-CM

## 2024-01-19 LAB
SL AMB  POCT GLUCOSE, UA: NEGATIVE
SL AMB POCT URINE PROTEIN: NEGATIVE

## 2024-01-19 PROCEDURE — 81002 URINALYSIS NONAUTO W/O SCOPE: CPT | Performed by: NURSE PRACTITIONER

## 2024-01-19 PROCEDURE — PNV: Performed by: NURSE PRACTITIONER

## 2024-01-19 NOTE — ASSESSMENT & PLAN NOTE
Beatrice Perry is a 28 y.o.  here for OB visit at 37w4d weeks. TWG 20.4 kg (45 lb).  No health concerns.     Denies LOF, vaginal bleeding with rare krish marx contractions.   Performing FKC's daily 10 in 2 hours  Performing perineal massage  GBS is negative  TDAP, influenza and abrysvo vaccines up to date  Covid up to date  Declines eIOL. May consider in future.  RTO 1 week

## 2024-01-19 NOTE — PROGRESS NOTES
Problem   Sciatic Leg Pain   37 Weeks Gestation of Pregnancy    Covid vaccine early October  Flu vaccine 28 wk visit (x)       37 weeks gestation of pregnancy  Beatrice Perry is a 28 y.o.  here for OB visit at 37w4d weeks. TWG 20.4 kg (45 lb).  No health concerns.     Denies LOF, vaginal bleeding with rare krish marx contractions.   Performing FKC's daily 10 in 2 hours  Performing perineal massage  GBS is negative  TDAP, influenza and abrysvo vaccines up to date  Covid up to date  Declines eIOL. May consider in future.  RTO 1 week    Sciatic leg pain  Intermittent and into right hamstring.   Doing stretches daily preventatively.   No worsening.

## 2024-01-21 PROBLEM — Z3A.38 38 WEEKS GESTATION OF PREGNANCY: Status: ACTIVE | Noted: 2023-06-30

## 2024-01-22 ENCOUNTER — TELEPHONE (OUTPATIENT)
Dept: PEDIATRICS CLINIC | Facility: CLINIC | Age: 29
End: 2024-01-22

## 2024-01-22 NOTE — TELEPHONE ENCOUNTER
LVM to to call back to schedule meet & greet with one of our providers in the [place] location, if interested.

## 2024-01-26 ENCOUNTER — ROUTINE PRENATAL (OUTPATIENT)
Dept: OBGYN CLINIC | Facility: MEDICAL CENTER | Age: 29
End: 2024-01-26
Payer: COMMERCIAL

## 2024-01-26 ENCOUNTER — HOSPITAL ENCOUNTER (OUTPATIENT)
Facility: HOSPITAL | Age: 29
Discharge: HOME | End: 2024-01-26
Attending: OBSTETRICS & GYNECOLOGY | Admitting: OBSTETRICS & GYNECOLOGY
Payer: COMMERCIAL

## 2024-01-26 ENCOUNTER — TELEPHONE (OUTPATIENT)
Dept: OBGYN CLINIC | Facility: CLINIC | Age: 29
End: 2024-01-26

## 2024-01-26 VITALS
RESPIRATION RATE: 18 BRPM | DIASTOLIC BLOOD PRESSURE: 75 MMHG | TEMPERATURE: 97.9 F | HEART RATE: 93 BPM | SYSTOLIC BLOOD PRESSURE: 108 MMHG

## 2024-01-26 VITALS
SYSTOLIC BLOOD PRESSURE: 128 MMHG | HEIGHT: 60 IN | BODY MASS INDEX: 32 KG/M2 | WEIGHT: 163 LBS | DIASTOLIC BLOOD PRESSURE: 80 MMHG | HEART RATE: 111 BPM

## 2024-01-26 DIAGNOSIS — Z34.03 ENCOUNTER FOR SUPERVISION OF NORMAL FIRST PREGNANCY IN THIRD TRIMESTER: ICD-10-CM

## 2024-01-26 DIAGNOSIS — Z3A.38 38 WEEKS GESTATION OF PREGNANCY: Primary | ICD-10-CM

## 2024-01-26 PROBLEM — N89.8 LEUKORRHEA: Status: RESOLVED | Noted: 2024-01-12 | Resolved: 2024-01-26

## 2024-01-26 LAB
APTT PPP: 27 SECONDS (ref 23–37)
ERYTHROCYTE [DISTWIDTH] IN BLOOD BY AUTOMATED COUNT: 13.5 % (ref 11.6–15.1)
FETAL RBC/1000 RBC BLD KLEIH BETKE-RTO: 0 % (ref 0–1)
FIBRINOGEN PPP-MCNC: 514 MG/DL (ref 207–520)
HCT VFR BLD AUTO: 40.1 % (ref 34.8–46.1)
HGB BLD-MCNC: 13.5 G/DL (ref 11.5–15.4)
INR PPP: 0.87 (ref 0.84–1.19)
MCH RBC QN AUTO: 30.3 PG (ref 26.8–34.3)
MCHC RBC AUTO-ENTMCNC: 33.7 G/DL (ref 31.4–37.4)
MCV RBC AUTO: 90 FL (ref 82–98)
PLATELET # BLD AUTO: 276 THOUSANDS/UL (ref 149–390)
PMV BLD AUTO: 9.6 FL (ref 8.9–12.7)
PROTHROMBIN TIME: 12.4 SECONDS (ref 11.6–14.5)
RBC # BLD AUTO: 4.45 MILLION/UL (ref 3.81–5.12)
SL AMB  POCT GLUCOSE, UA: NORMAL
SL AMB POCT URINE PROTEIN: NORMAL
WBC # BLD AUTO: 12.13 THOUSAND/UL (ref 4.31–10.16)

## 2024-01-26 PROCEDURE — 99213 OFFICE O/P EST LOW 20 MIN: CPT

## 2024-01-26 PROCEDURE — 85027 COMPLETE CBC AUTOMATED: CPT

## 2024-01-26 PROCEDURE — 81002 URINALYSIS NONAUTO W/O SCOPE: CPT | Performed by: CLINICAL NURSE SPECIALIST

## 2024-01-26 PROCEDURE — PNV: Performed by: CLINICAL NURSE SPECIALIST

## 2024-01-26 PROCEDURE — 85460 HEMOGLOBIN FETAL: CPT

## 2024-01-26 PROCEDURE — 85384 FIBRINOGEN ACTIVITY: CPT

## 2024-01-26 PROCEDURE — NC001 PR NO CHARGE: Performed by: OBSTETRICS & GYNECOLOGY

## 2024-01-26 PROCEDURE — 85730 THROMBOPLASTIN TIME PARTIAL: CPT

## 2024-01-26 PROCEDURE — 85610 PROTHROMBIN TIME: CPT

## 2024-01-26 RX ADMIN — SODIUM CHLORIDE, SODIUM LACTATE, POTASSIUM CHLORIDE, AND CALCIUM CHLORIDE 1000 ML: .6; .31; .03; .02 INJECTION, SOLUTION INTRAVENOUS at 12:00

## 2024-01-26 NOTE — TELEPHONE ENCOUNTER
----- Message from DIAMOND Brandon sent at 1/26/2024  9:00 AM EST -----  Regarding: IOL- Post dates  Procedure to be scheduled (IOL or CS): IOL    JAYESH: Estimated Date of Delivery: 2/5/24     Indication for delivery: Post dates    Requested date (s) of delivery: will be 41 wks on 2/12- so as close to that if possible. If after that date will need A/N testing with MFM   If requested date is unavailable, is there a date by which the pt must be delivered?    Physician preference: n/a    If IOL, anticipated method: will need ripening     If CS, with or without tubal: n/a

## 2024-01-26 NOTE — PROGRESS NOTES
Prenatal Visit  Subjective:   Beatrice is a 28 y.o.  38w4d here for Routine Prenatal Visit (Jay   /O+ /Labs utd /Yellow folder given and reviewed /Del consent signed today /Breast pump completed through insurance /Peds placed /Tdap 23 /Flu given 23/RSV 24/GBS neg )    Denies unusual vaginal discharge, LOF, VB, or ctx. Reports Fetus is active.     Objective:  Vitals:    24 0849   BP: 128/80   Pulse: (!) 111     Pregravid Weight/BMI: 54 kg (119 lb) (BMI 23.18)  Current Weight: 73.9 kg (163 lb)   Total Weight Gain: 20 kg (44 lb)       OBGyn Exam  Physical Exam  Fetal Heart Rate: 150 , Fundal Height (cm): 37 cm    General: Not in acute distress and appearing well nourished and well groomed  Genitourinary:          Pelvic exam  cervix closed/50/-3   Cardiovascular:   Rate and Rhythm: Normal rate.   Pulmonary:    Effort: normal, not labored  Abdominal:  Abdomen is soft and gravid    Musculoskeletal: Active movement of all extremities, no gross limitations of ROM     Edema: None  Neurological:   Mental Status: She is alert and oriented to person, place, and time.   Skin: General: Skin is warm and dry.   Psychiatric:    Mood and Affect: Mood normal.      Behavior: Behavior normal    Assessment & Plan:      1. 38 weeks gestation of pregnancy  Assessment & Plan:  38w4d Doing well, reports fetus remains active.  GBS results reviewed with patient. neg    Cervix Was checked today. Closed/50/-3    Reviewed benefits of perineal massage - to be done 1-4 times per week x 5-10 minutes- education in AVS given  I have reviewed the signs and symptoms of labor with the patient, including contractions q4-5 minutes for greater than 1 hour, vaginal bleeding, leaking fluid and decreased fetal movement.  Reviewed process for FKC.I have emphasized the continued importance of paying close attention to the baby's movements.  I have instructed the patient to call the office with any of the above symptoms prior to  coming to the hospital.     Signs of pre-eclampsia were also reviewed with the patient: headache, visual changes, sudden increased edema and/or severe right upper quadrant pain.    Discussed ARRIVE trial and option for eIOL at 39 wks. Declines elective IOL, but agreeable to 41 wk IOL. Consent reviewed including Methods for cervical ripening (cytotec/cain) and IV pitocin;and signed. Msg sent to triage      2. Encounter for supervision of normal first pregnancy in third trimester  -     POCT urine dip        DIAMOND Porter  1/26/2024

## 2024-01-26 NOTE — PROGRESS NOTES
Pt returned to office after leaving b/c she reports she slippled/tripped on curb and fell. Broke fall with right hand, slight hit to left side of abdomen and ultimately landed on buttocks. No bruising noted so far. Right hand/wrist with slight abrasion, but no bleeding. Wrist cleansed. Offered ice pack, declined.   Checked FHR and WNL at 150- same as previous. Advised to go directly to triage for monitoring.   Dr Donohue on call notified.

## 2024-01-26 NOTE — ASSESSMENT & PLAN NOTE
38w4d Doing well, reports fetus remains active.  GBS results reviewed with patient. neg    Cervix Was checked today. Closed/50/-3    Reviewed benefits of perineal massage - to be done 1-4 times per week x 5-10 minutes- education in AVS given  I have reviewed the signs and symptoms of labor with the patient, including contractions q4-5 minutes for greater than 1 hour, vaginal bleeding, leaking fluid and decreased fetal movement.  Reviewed process for FKC.I have emphasized the continued importance of paying close attention to the baby's movements.  I have instructed the patient to call the office with any of the above symptoms prior to coming to the hospital.     Signs of pre-eclampsia were also reviewed with the patient: headache, visual changes, sudden increased edema and/or severe right upper quadrant pain.    Discussed ARRIVE trial and option for eIOL at 39 wks. Declines elective IOL, but agreeable to 41 wk IOL. Consent reviewed including Methods for cervical ripening (cytotec/cain) and IV pitocin;and signed. Msg sent to triage

## 2024-01-26 NOTE — PROGRESS NOTES
L&D Triage Note - OB/GYN  Beatrice Perry 28 y.o. female MRN: [id number]  Unit/Bed#:  TRIAGE  Encounter: [id number]      ASSESSMENT:    Beatrice Perry is a 28 y.o.  at 38w4d presents due to a fall    PLAN:    1) Slipped and fell  Plan to monitor 4 hours post fall   Re-check cervical exam: finger tip/50/-3  1L lactated ringer  Fibrinogen, CBC, INR, APPT are all WNL  Kleihauer-betke stain is pending, will call patient with results if they are positive      2) Continue routine prenatal care  3) Discharge from OB triage with  labor precautions    - Reviewed rupture of membranes, false vs true labor, decreased fetal movement, and vaginal bleeding   - Pt to call provider with any concerns and follow up at her next scheduled prenatal appointment 24   - Case discussed with Dr. Donohue    SUBJECTIVE:    Beatrice Perry 28 y.o.  at 38w4d with an Estimated Date of Delivery: 24 Pt went for her re-bobby visit today morning and tripped and fell on her way out after the appointment. She fell on her right buttocks and is not sure if she hit her belly or not. There is no bruising to the area. Pt denies any leaking of fluid, vaginal bleeding and does not feel any contractions. Pt does feel the baby move. Denies any chest pain, sob, nausea, vomiting, diarrhea, dizziness.     Her current obstetrical history is significant for NA    Her past obstetrical history is significant for NA    Contractions: Present  Leakage of fluid: No  Vaginal Bleeding: No  Fetal movement: present    OBJECTIVE:    Vitals:    24 1002   BP: 108/75   Pulse: 93   Resp: 18   Temp:        ROS:  Constitutional: Negative  Respiratory: Negative  Cardiovascular: Negative    Gastrointestinal: Negative      General Physical Exam:  General: in no apparent distress  Cardiovascular: Cor RRR  Lungs: non-labored breathing  Abdomen: abdomen is soft without significant tenderness, masses, organomegaly or guarding  Lower  extremeties: nontender    Cervical Exam  Speculum: Cervical os is  SVE: Finger tip / 50% / -3    Fetal monitoring:  FHT:  125 bpm/ Moderate 6 - 25 bpm / 15 x 15 accelerations present, no decelerations  Icard: contractions        Nathalie Briscoe MD,  SLW FM PGY-1  1/26/2024 10:39 AM

## 2024-02-01 PROBLEM — Z3A.39 39 WEEKS GESTATION OF PREGNANCY: Status: ACTIVE | Noted: 2023-06-30

## 2024-02-01 NOTE — TELEPHONE ENCOUNTER
2/12 8pm NP    Left message      Patient notified of IOL date,time,and location. Advised patient she may eat a light breakfast/dinner prior to going to L&D. In the interim please report any vaginal bleeding,leakage of fluid,decreased fetal movement or contractions.Reviewed fetal kick counts. Advised to keep all upcoming prenatal visits.

## 2024-02-02 ENCOUNTER — ROUTINE PRENATAL (OUTPATIENT)
Dept: OBGYN CLINIC | Facility: MEDICAL CENTER | Age: 29
End: 2024-02-02
Payer: COMMERCIAL

## 2024-02-02 VITALS
BODY MASS INDEX: 31.45 KG/M2 | DIASTOLIC BLOOD PRESSURE: 80 MMHG | SYSTOLIC BLOOD PRESSURE: 110 MMHG | HEIGHT: 61 IN | WEIGHT: 166.6 LBS

## 2024-02-02 DIAGNOSIS — Z3A.39 39 WEEKS GESTATION OF PREGNANCY: ICD-10-CM

## 2024-02-02 DIAGNOSIS — Z34.03 ENCOUNTER FOR SUPERVISION OF NORMAL FIRST PREGNANCY IN THIRD TRIMESTER: Primary | ICD-10-CM

## 2024-02-02 LAB
SL AMB  POCT GLUCOSE, UA: NEGATIVE
SL AMB POCT URINE PROTEIN: NEGATIVE

## 2024-02-02 PROCEDURE — 81002 URINALYSIS NONAUTO W/O SCOPE: CPT | Performed by: NURSE PRACTITIONER

## 2024-02-02 PROCEDURE — PNV: Performed by: NURSE PRACTITIONER

## 2024-02-02 NOTE — ASSESSMENT & PLAN NOTE
Beatrice Perry is a 28 y.o.  here for OB visit at 39w4d weeks. TWG 21.6 kg (47 lb 9.6 oz).  No health concerns.     Evaluated in L&D after her last OB appt as she fell. Normal findings with negative KB test.  Fully recovered from her fall.   Denies LOF, vaginal bleeding with rare contractions.   Performing FKC's daily 10 in 2 hours  Performing perineal massage  GBS negative   TDAP, influenza vaccine and abrysvo are up to date  eIOL planned for late term on 24  RTO 1 week

## 2024-02-02 NOTE — PROGRESS NOTES
Problem   Sciatic Leg Pain   39 Weeks Gestation of Pregnancy    Covid vaccine early October  Flu vaccine 28 wk visit (x)       39 weeks gestation of pregnancy  Beatrice Perry is a 28 y.o.  here for OB visit at 39w4d weeks. TWG 21.6 kg (47 lb 9.6 oz).  No health concerns.     Evaluated in L&D after her last OB appt as she fell. Normal findings with negative KB test.  Fully recovered from her fall.   Denies LOF, vaginal bleeding with rare contractions.   Performing FKC's daily 10 in 2 hours  Performing perineal massage  GBS negative   TDAP, influenza vaccine and abrysvo are up to date  eIOL planned for late term on 24  RTO 1 week    Sciatic leg pain  Intermittent and stable.  Flares at night.

## 2024-02-09 ENCOUNTER — ROUTINE PRENATAL (OUTPATIENT)
Dept: OBGYN CLINIC | Facility: MEDICAL CENTER | Age: 29
End: 2024-02-09
Payer: COMMERCIAL

## 2024-02-09 ENCOUNTER — HOSPITAL ENCOUNTER (OUTPATIENT)
Facility: HOSPITAL | Age: 29
Discharge: HOME | End: 2024-02-09
Attending: OBSTETRICS & GYNECOLOGY | Admitting: OBSTETRICS & GYNECOLOGY
Payer: COMMERCIAL

## 2024-02-09 VITALS
DIASTOLIC BLOOD PRESSURE: 72 MMHG | SYSTOLIC BLOOD PRESSURE: 104 MMHG | BODY MASS INDEX: 31.64 KG/M2 | HEIGHT: 61 IN | HEART RATE: 90 BPM | WEIGHT: 167.6 LBS

## 2024-02-09 VITALS
TEMPERATURE: 98.8 F | SYSTOLIC BLOOD PRESSURE: 112 MMHG | RESPIRATION RATE: 20 BRPM | HEART RATE: 111 BPM | DIASTOLIC BLOOD PRESSURE: 72 MMHG

## 2024-02-09 DIAGNOSIS — Z34.03 PRENATAL CARE, FIRST PREGNANCY IN THIRD TRIMESTER: Primary | ICD-10-CM

## 2024-02-09 DIAGNOSIS — Z3A.40 40 WEEKS GESTATION OF PREGNANCY: ICD-10-CM

## 2024-02-09 LAB
SL AMB  POCT GLUCOSE, UA: ABNORMAL
SL AMB POCT URINE PROTEIN: ABNORMAL

## 2024-02-09 PROCEDURE — 76815 OB US LIMITED FETUS(S): CPT

## 2024-02-09 PROCEDURE — PNV: Performed by: STUDENT IN AN ORGANIZED HEALTH CARE EDUCATION/TRAINING PROGRAM

## 2024-02-09 PROCEDURE — 76815 OB US LIMITED FETUS(S): CPT | Performed by: OBSTETRICS & GYNECOLOGY

## 2024-02-09 PROCEDURE — 99213 OFFICE O/P EST LOW 20 MIN: CPT

## 2024-02-09 PROCEDURE — 81002 URINALYSIS NONAUTO W/O SCOPE: CPT | Performed by: STUDENT IN AN ORGANIZED HEALTH CARE EDUCATION/TRAINING PROGRAM

## 2024-02-09 PROCEDURE — NC001 PR NO CHARGE: Performed by: OBSTETRICS & GYNECOLOGY

## 2024-02-09 NOTE — PROGRESS NOTES
L&D Triage Note - OB/GYN  Beatrice Perry 28 y.o. female MRN: [id number]  Unit/Bed#:  TRIAGE  Encounter: [id number]    Beatrice Perry is a patient of [facility]    JAYESH: Estimated Date of Delivery: 24    HPI:  28 y.o.  40w4d sent from the office for non-reactive NST. Patient feels well and denies any concerns today. She reports feeling her baby move. Her cervix was closed when she was in the office today. Patient has elective induction scheduled for 24 but she states she is hoping to go into labor naturally before this. She reports she has been moving around at home and has a ball which she uses.     Contractions: intermittent contractions, non-sustained  Leakage of fluid: no  Vaginal Bleeding: no  Fetal movement: present    Her current obstetrical history is uncomplicated      ROS:  Constitutional: Negative  Respiratory: Negative  Cardiovascular: Negative    Gastrointestinal: Negative    OBJECTIVE:  /72 (BP Location: Right arm)   Pulse (!) 111   Temp 98.8 °F (37.1 °C) (Oral)   Resp 20   LMP 2023   There is no height or weight on file to calculate BMI.    Physical Exam  Constitutional:       Appearance: Normal appearance.   HENT:      Head: Normocephalic.   Pulmonary:      Effort: Pulmonary effort is normal.   Abdominal:      Comments: Gravid, nontender   Skin:     General: Skin is warm and dry.   Neurological:      Mental Status: She is alert.   Psychiatric:         Mood and Affect: Mood normal.         Behavior: Behavior normal.         SVE:    Checked in office today: 0/50/-3    FHT:  Baseline Rate (FHR): 140 bpm  Variability: Moderate  Accelerations: 15 x 15 or greater, With fetal movement  Decelerations: None    TOCO:     Irregular contractions        TAUS   JESUS      - Q1 4.04 cm     - Q2 5.20 cm     - Q3 4.68 cm     - Q4 0.0 cm     - Total: 13.9 cm   Presentation: Vertex    Labs:   Recent Results (from the past 24 hour(s))   POCT urine dip    Collection Time:  24 10:21 AM   Result Value Ref Range    POCT URINE PROTEIN trace     GLUCOSE, UA neg          ASSESSMENT  Beatrice Perry is a 28 y.o.  at 40w4d sent from the office for nonreactive NST.  NST in triage reactive with fetal heart rate baseline of 140 with accelerations and no decelerations present.  Patient had 2 contractions on monitor but nonsustained.  JESUS 13.9, normal.  Discussed with patient risks versus benefits of staying for elective induction of labor today.  Patient would prefer to be discharged home in the hope of going into labor naturally and keep scheduled induction for 2024.     PLAN  #1.  Nonreactive NST in office:   NST reactive in triage: , moderate variability, accelerations present, no decelerations  JESUS normal at 13.9 cm  Patient has no other concerns today  Discussed natural methods of induction of labor such as utilizing breast pump, moving around, using ball at home  Patient will return 2024 if she does not go into labor at home  Reviewed methods of induction of labor and cervical ripening  Return precautions discussed      # Discharge instructions  Patient instructed to call if experiencing worsening contractions, vaginal bleeding, loss of fluid or decreased fetal movement.  Will follow up for scheduled induction on 2024.    Workup completed with Dr. Hagan  D[facility]  2024  11:58 AM

## 2024-02-09 NOTE — PROGRESS NOTES
40 weeks gestation of pregnancy  27yo  at 40.4wks presents for routine prenatal visit     Pt denies contractions, vaginal bleeding, leakage of fluid. Endorses fetal movement.     -continue PNVs   -GBS negative   -s/p RSV, tdap, flu vaccine   -/-3  -induction of labor scheduled 24, induction and delivery consents signed   -labor precautions provided   -on US doppler,  bpm persistently. NST subsequently performed, baseline 150bpm, however nonreactive NST. Sent to L&D for evaluation.   -return post partum

## 2024-02-09 NOTE — ASSESSMENT & PLAN NOTE
29yo  at 40.4wks presents for routine prenatal visit     Pt denies contractions, vaginal bleeding, leakage of fluid. Endorses fetal movement.     -continue PNVs   -GBS negative   -s/p RSV, tdap, flu vaccine   -/-3  -induction of labor scheduled 24, induction and delivery consents signed   -labor precautions provided   -on US doppler,  bpm persistently. NST subsequently performed, baseline 150bpm, however nonreactive NST. Sent to L&D for evaluation.   -return post partum

## 2024-02-09 NOTE — PROCEDURES
Beatrice Perry, a  at 40w4d with an JAYESH of 2024, by Last Menstrual Period, was seen at [facility] for the following procedure(s): $Procedure Type: JESUS]    Nonstress Test  Variability: Moderate  Decelerations: None  Accelerations: Yes  Baseline: 140 BPM  Uterine Irritability: No  Contractions: Irregular    4 Quadrant JESUS  JESUS Q1 (cm): 4 cm  JESUS Q2 (cm): 5.2 cm  JESUS Q3 (cm): 4.7 cm  JESUS Q4 (cm): 0 cm  JESUS TOTAL (cm): 13.9 cm              Interpretation  Nonstress Test Interpretation: Reactive  Overall Impression: Reassuring      JESUS completed with Dr. Danya Mendoza M.D.  [facility] PGY1  2024, 11:38 AM

## 2024-02-12 ENCOUNTER — HOSPITAL ENCOUNTER (INPATIENT)
Facility: HOSPITAL | Age: 29
LOS: 3 days | Discharge: HOME | End: 2024-02-15
Attending: STUDENT IN AN ORGANIZED HEALTH CARE EDUCATION/TRAINING PROGRAM | Admitting: STUDENT IN AN ORGANIZED HEALTH CARE EDUCATION/TRAINING PROGRAM
Payer: COMMERCIAL

## 2024-02-12 ENCOUNTER — HOSPITAL ENCOUNTER (OUTPATIENT)
Dept: LABOR AND DELIVERY | Facility: HOSPITAL | Age: 29
Discharge: HOME | End: 2024-02-12
Payer: COMMERCIAL

## 2024-02-12 DIAGNOSIS — F32.A DEPRESSION AFFECTING PREGNANCY: Primary | ICD-10-CM

## 2024-02-12 DIAGNOSIS — O99.340 DEPRESSION AFFECTING PREGNANCY: Primary | ICD-10-CM

## 2024-02-12 PROBLEM — Z34.90 ENCOUNTER FOR INDUCTION OF LABOR: Status: ACTIVE | Noted: 2024-02-12

## 2024-02-12 LAB
ABO GROUP BLD: NORMAL
BLD GP AB SCN SERPL QL: NEGATIVE
ERYTHROCYTE [DISTWIDTH] IN BLOOD BY AUTOMATED COUNT: 13.5 % (ref 11.6–15.1)
HCT VFR BLD AUTO: 38.6 % (ref 34.8–46.1)
HGB BLD-MCNC: 12.9 G/DL (ref 11.5–15.4)
MCH RBC QN AUTO: 30.1 PG (ref 26.8–34.3)
MCHC RBC AUTO-ENTMCNC: 33.4 G/DL (ref 31.4–37.4)
MCV RBC AUTO: 90 FL (ref 82–98)
PLATELET # BLD AUTO: 312 THOUSANDS/UL (ref 149–390)
PMV BLD AUTO: 9.5 FL (ref 8.9–12.7)
RBC # BLD AUTO: 4.28 MILLION/UL (ref 3.81–5.12)
RH BLD: POSITIVE
SPECIMEN EXPIRATION DATE: NORMAL
WBC # BLD AUTO: 11.12 THOUSAND/UL (ref 4.31–10.16)

## 2024-02-12 PROCEDURE — 86900 BLOOD TYPING SEROLOGIC ABO: CPT

## 2024-02-12 PROCEDURE — 85027 COMPLETE CBC AUTOMATED: CPT

## 2024-02-12 PROCEDURE — 86780 TREPONEMA PALLIDUM: CPT

## 2024-02-12 PROCEDURE — 86901 BLOOD TYPING SEROLOGIC RH(D): CPT

## 2024-02-12 PROCEDURE — 0KQM0ZZ REPAIR PERINEUM MUSCLE, OPEN APPROACH: ICD-10-PCS | Performed by: OBSTETRICS & GYNECOLOGY

## 2024-02-12 PROCEDURE — 86850 RBC ANTIBODY SCREEN: CPT

## 2024-02-12 PROCEDURE — 3E033VJ INTRODUCTION OF OTHER HORMONE INTO PERIPHERAL VEIN, PERCUTANEOUS APPROACH: ICD-10-PCS | Performed by: OBSTETRICS & GYNECOLOGY

## 2024-02-12 PROCEDURE — NC001 PR NO CHARGE: Performed by: STUDENT IN AN ORGANIZED HEALTH CARE EDUCATION/TRAINING PROGRAM

## 2024-02-12 RX ORDER — SODIUM CHLORIDE, SODIUM LACTATE, POTASSIUM CHLORIDE, CALCIUM CHLORIDE 600; 310; 30; 20 MG/100ML; MG/100ML; MG/100ML; MG/100ML
125 INJECTION, SOLUTION INTRAVENOUS CONTINUOUS
Status: DISCONTINUED | OUTPATIENT
Start: 2024-02-12 | End: 2024-02-13

## 2024-02-12 RX ORDER — ONDANSETRON 2 MG/ML
4 INJECTION INTRAMUSCULAR; INTRAVENOUS EVERY 6 HOURS PRN
Status: DISCONTINUED | OUTPATIENT
Start: 2024-02-12 | End: 2024-02-13

## 2024-02-12 RX ORDER — BUPIVACAINE HYDROCHLORIDE 2.5 MG/ML
30 INJECTION, SOLUTION EPIDURAL; INFILTRATION; INTRACAUDAL ONCE AS NEEDED
Status: DISCONTINUED | OUTPATIENT
Start: 2024-02-12 | End: 2024-02-13

## 2024-02-12 RX ADMIN — Medication 25 MCG: at 23:00

## 2024-02-13 ENCOUNTER — ANESTHESIA EVENT (INPATIENT)
Dept: ANESTHESIOLOGY | Facility: HOSPITAL | Age: 29
End: 2024-02-13
Payer: COMMERCIAL

## 2024-02-13 ENCOUNTER — ANESTHESIA (INPATIENT)
Dept: ANESTHESIOLOGY | Facility: HOSPITAL | Age: 29
End: 2024-02-13
Payer: COMMERCIAL

## 2024-02-13 LAB
BASE EXCESS BLDCOA CALC-SCNC: -7.3 MMOL/L (ref 3–11)
BASE EXCESS BLDCOV CALC-SCNC: -3 MMOL/L (ref 1–9)
HCO3 BLDCOA-SCNC: 20.5 MMOL/L (ref 17.3–27.3)
HCO3 BLDCOV-SCNC: 21.3 MMOL/L (ref 12.2–28.6)
HOLD SPECIMEN: NORMAL
O2 CT VFR BLDCOA CALC: 11.4 ML/DL
OXYHGB MFR BLDCOA: 52.3 %
OXYHGB MFR BLDCOV: 74.5 %
PCO2 BLDCOA: 49.4 MM[HG] (ref 30–60)
PCO2 BLDCOV: 36.3 MM HG (ref 27–43)
PH BLDCOA: 7.24 [PH] (ref 7.23–7.43)
PH BLDCOV: 7.39 [PH] (ref 7.19–7.49)
PO2 BLDCOA: 24.4 MM HG (ref 5–25)
PO2 BLDCOV: 31.1 MM HG (ref 15–45)
SAO2 % BLDCOV: 15.9 ML/DL
TREPONEMA PALLIDUM IGG+IGM AB [PRESENCE] IN SERUM OR PLASMA BY IMMUNOASSAY: NORMAL

## 2024-02-13 PROCEDURE — 59400 OBSTETRICAL CARE: CPT | Performed by: OBSTETRICS & GYNECOLOGY

## 2024-02-13 PROCEDURE — 88307 TISSUE EXAM BY PATHOLOGIST: CPT | Performed by: PATHOLOGY

## 2024-02-13 PROCEDURE — NC001 PR NO CHARGE: Performed by: STUDENT IN AN ORGANIZED HEALTH CARE EDUCATION/TRAINING PROGRAM

## 2024-02-13 PROCEDURE — 82805 BLOOD GASES W/O2 SATURATION: CPT | Performed by: OBSTETRICS & GYNECOLOGY

## 2024-02-13 RX ORDER — DIPHENHYDRAMINE HCL 25 MG
25 TABLET ORAL EVERY 6 HOURS PRN
Status: DISCONTINUED | OUTPATIENT
Start: 2024-02-13 | End: 2024-02-15 | Stop reason: HOSPADM

## 2024-02-13 RX ORDER — ROPIVACAINE HYDROCHLORIDE 2 MG/ML
INJECTION, SOLUTION EPIDURAL; INFILTRATION; PERINEURAL AS NEEDED
Status: DISCONTINUED | OUTPATIENT
Start: 2024-02-13 | End: 2024-02-14 | Stop reason: HOSPADM

## 2024-02-13 RX ORDER — ONDANSETRON 2 MG/ML
4 INJECTION INTRAMUSCULAR; INTRAVENOUS EVERY 8 HOURS PRN
Status: DISCONTINUED | OUTPATIENT
Start: 2024-02-13 | End: 2024-02-15 | Stop reason: HOSPADM

## 2024-02-13 RX ORDER — OXYTOCIN/RINGER'S LACTATE 30/500 ML
250 PLASTIC BAG, INJECTION (ML) INTRAVENOUS ONCE
Status: COMPLETED | OUTPATIENT
Start: 2024-02-13 | End: 2024-02-13

## 2024-02-13 RX ORDER — CLINDAMYCIN PHOSPHATE 900 MG/50ML
900 INJECTION, SOLUTION INTRAVENOUS EVERY 8 HOURS
Status: DISCONTINUED | OUTPATIENT
Start: 2024-02-13 | End: 2024-02-13

## 2024-02-13 RX ORDER — CALCIUM CARBONATE 500 MG/1
1000 TABLET, CHEWABLE ORAL DAILY PRN
Status: DISCONTINUED | OUTPATIENT
Start: 2024-02-13 | End: 2024-02-15 | Stop reason: HOSPADM

## 2024-02-13 RX ORDER — LIDOCAINE HYDROCHLORIDE AND EPINEPHRINE 15; 5 MG/ML; UG/ML
INJECTION, SOLUTION EPIDURAL
Status: COMPLETED | OUTPATIENT
Start: 2024-02-13 | End: 2024-02-13

## 2024-02-13 RX ORDER — ACETAMINOPHEN 325 MG/1
650 TABLET ORAL EVERY 4 HOURS PRN
Status: DISCONTINUED | OUTPATIENT
Start: 2024-02-13 | End: 2024-02-15 | Stop reason: HOSPADM

## 2024-02-13 RX ORDER — ACETAMINOPHEN 325 MG/1
975 TABLET ORAL EVERY 8 HOURS PRN
Status: DISCONTINUED | OUTPATIENT
Start: 2024-02-13 | End: 2024-02-13

## 2024-02-13 RX ORDER — IBUPROFEN 600 MG/1
600 TABLET ORAL EVERY 6 HOURS
Status: DISCONTINUED | OUTPATIENT
Start: 2024-02-13 | End: 2024-02-15 | Stop reason: HOSPADM

## 2024-02-13 RX ORDER — DOCUSATE SODIUM 100 MG/1
100 CAPSULE, LIQUID FILLED ORAL 2 TIMES DAILY
Status: DISCONTINUED | OUTPATIENT
Start: 2024-02-13 | End: 2024-02-15 | Stop reason: HOSPADM

## 2024-02-13 RX ORDER — BENZOCAINE/MENTHOL 6 MG-10 MG
1 LOZENGE MUCOUS MEMBRANE DAILY PRN
Status: DISCONTINUED | OUTPATIENT
Start: 2024-02-13 | End: 2024-02-15 | Stop reason: HOSPADM

## 2024-02-13 RX ORDER — SODIUM CHLORIDE, SODIUM LACTATE, POTASSIUM CHLORIDE, CALCIUM CHLORIDE 600; 310; 30; 20 MG/100ML; MG/100ML; MG/100ML; MG/100ML
100 INJECTION, SOLUTION INTRAVENOUS CONTINUOUS
Status: DISCONTINUED | OUTPATIENT
Start: 2024-02-13 | End: 2024-02-13

## 2024-02-13 RX ORDER — OXYTOCIN/RINGER'S LACTATE 30/500 ML
1-30 PLASTIC BAG, INJECTION (ML) INTRAVENOUS
Status: DISCONTINUED | OUTPATIENT
Start: 2024-02-13 | End: 2024-02-13

## 2024-02-13 RX ADMIN — ROPIVACAINE HYDROCHLORIDE 5 MG: 2 INJECTION EPIDURAL; INFILTRATION; PERINEURAL at 10:22

## 2024-02-13 RX ADMIN — SODIUM CHLORIDE, SODIUM LACTATE, POTASSIUM CHLORIDE, AND CALCIUM CHLORIDE 100 ML/HR: .6; .31; .03; .02 INJECTION, SOLUTION INTRAVENOUS at 13:47

## 2024-02-13 RX ADMIN — ACETAMINOPHEN 975 MG: 325 TABLET, FILM COATED ORAL at 19:00

## 2024-02-13 RX ADMIN — ROPIVACAINE HYDROCHLORIDE: 2 INJECTION, SOLUTION EPIDURAL; INFILTRATION at 10:28

## 2024-02-13 RX ADMIN — WITCH HAZEL 1 PAD: 500 SOLUTION RECTAL; TOPICAL at 21:26

## 2024-02-13 RX ADMIN — SODIUM CHLORIDE, SODIUM LACTATE, POTASSIUM CHLORIDE, AND CALCIUM CHLORIDE 999 ML/HR: .6; .31; .03; .02 INJECTION, SOLUTION INTRAVENOUS at 10:23

## 2024-02-13 RX ADMIN — SODIUM CHLORIDE, SODIUM LACTATE, POTASSIUM CHLORIDE, AND CALCIUM CHLORIDE 125 ML/HR: .6; .31; .03; .02 INJECTION, SOLUTION INTRAVENOUS at 06:01

## 2024-02-13 RX ADMIN — OXYTOCIN 2 MILLI-UNITS/MIN: 10 INJECTION INTRAVENOUS at 06:01

## 2024-02-13 RX ADMIN — CLINDAMYCIN IN 5 PERCENT DEXTROSE 900 MG: 18 INJECTION, SOLUTION INTRAVENOUS at 18:59

## 2024-02-13 RX ADMIN — SODIUM CHLORIDE, SODIUM LACTATE, POTASSIUM CHLORIDE, AND CALCIUM CHLORIDE 500 ML: .6; .31; .03; .02 INJECTION, SOLUTION INTRAVENOUS at 12:45

## 2024-02-13 RX ADMIN — SODIUM CHLORIDE, SODIUM LACTATE, POTASSIUM CHLORIDE, AND CALCIUM CHLORIDE 125 ML/HR: .6; .31; .03; .02 INJECTION, SOLUTION INTRAVENOUS at 16:28

## 2024-02-13 RX ADMIN — ROPIVACAINE HYDROCHLORIDE: 2 INJECTION, SOLUTION EPIDURAL; INFILTRATION at 18:14

## 2024-02-13 RX ADMIN — DOCUSATE SODIUM 100 MG: 100 CAPSULE, LIQUID FILLED ORAL at 21:26

## 2024-02-13 RX ADMIN — OXYTOCIN 250 MILLI-UNITS/MIN: 10 INJECTION INTRAVENOUS at 19:37

## 2024-02-13 RX ADMIN — GENTAMICIN SULFATE 296 MG: 40 INJECTION, SOLUTION INTRAMUSCULAR; INTRAVENOUS at 20:10

## 2024-02-13 RX ADMIN — BENZOCAINE AND LEVOMENTHOL 1 APPLICATION: 200; 5 SPRAY TOPICAL at 21:26

## 2024-02-13 RX ADMIN — LIDOCAINE HYDROCHLORIDE AND EPINEPHRINE 3 ML: 15; 5 INJECTION, SOLUTION EPIDURAL at 10:17

## 2024-02-13 RX ADMIN — SODIUM CHLORIDE, SODIUM LACTATE, POTASSIUM CHLORIDE, AND CALCIUM CHLORIDE 999 ML/HR: .6; .31; .03; .02 INJECTION, SOLUTION INTRAVENOUS at 01:40

## 2024-02-13 NOTE — OB LABOR/OXYTOCIN SAFETY PROGRESS
Oxytocin Safety Progress Check Note - Beatrice Perry 28 y.o. female MRN: [id number]    Unit/Bed#: -01 Encounter: [id number]    Dose (rommel-units/min) Oxytocin: 10 rommel-units/min  Contraction Frequency (minutes): 1.5-3  Contraction Intensity: Moderate  Uterine Activity Characteristics: Regular  Cervical Dilation: 5-6        Cervical Effacement: 60  Fetal Station: -2  Baseline Rate (FHR): 135 bpm  Fetal Heart Rate (FHT): 150 BPM  FHR Category: 1               Vital Signs:   Vitals:    02/13/24 1031   BP: 105/58   Pulse: 75   Resp:    Temp:    SpO2:        Notes/comments:   SVE as above. Currently category I tracing, intermittent variables previously have resolved with repositioning. Continue pitocin titration. Patient comfortable with epidural. D/w Dr. Flower Pretty MD 2/13/2024 10:50 AM

## 2024-02-13 NOTE — ANESTHESIA PREPROCEDURE EVALUATION
Procedure:  LABOR ANALGESIA    Relevant Problems   GYN   (+) 40 weeks gestation of pregnancy   (+) Encounter for supervision of normal first pregnancy in third trimester      MUSCULOSKELETAL   (+) Sciatic leg pain        Physical Exam    Airway       Dental       Cardiovascular      Pulmonary      Other Findings  post-pubertal.      Anesthesia Plan  ASA Score- 2     Anesthesia Type- epidural with ASA Monitors.         Additional Monitors:     Airway Plan:     Comment: Discussed the risks/benefits of neuraxial anesthesia, including risk of bleeding/infection/damage to underlying structures, the likely side effect of nausea, and unlikely complication of spinal headache..       Plan Factors-Exercise tolerance (METS): >4 METS.    Chart reviewed.   Existing labs reviewed. Patient summary reviewed.    Patient is not a current smoker.  Patient instructed to abstain from smoking on day of procedure. Patient did not smoke on day of surgery.            Induction-     Postoperative Plan-     Informed Consent- Anesthetic plan and risks discussed with patient.  I personally reviewed this patient with the CRNA. Discussed and agreed on the Anesthesia Plan with the CRNA..

## 2024-02-13 NOTE — PLAN OF CARE
Problem: Knowledge Deficit  Goal: Verbalizes understanding of labor plan  Description: Assess patient/family/caregiver's baseline knowledge level and ability to understand information.  Provide education via patient/family/caregiver's preferred learning method at appropriate level of understanding.     1. Provide teaching at level of understanding.  2. Provide teaching via preferred learning method(s).  2/13/2024 0726 by Brandie Wallace RN  Outcome: Progressing  2/13/2024 0725 by Brandie Wallace RN  Outcome: Progressing  Goal: Patient/family/caregiver demonstrates understanding of disease process, treatment plan, medications, and discharge instructions  Description: Complete learning assessment and assess knowledge base.  Interventions:  - Provide teaching at level of understanding  - Provide teaching via preferred learning methods  Outcome: Progressing     Problem: Labor & Delivery  Goal: Manages discomfort  Description: Assess and monitor for signs and symptoms of discomfort.  Assess patient's pain level regularly and per hospital policy.  Administer medications as ordered. Support use of nonpharmacological methods to help control pain such as distraction, imagery, relaxation, and application of heat and cold.  Collaborate with interdisciplinary team and patient to determine appropriate pain management plan.    1. Include patient in decisions related to comfort.  2. Offer non-pharmacological pain management interventions.  3. Report ineffective pain management to physician.  2/13/2024 0726 by Brandie Wallace RN  Outcome: Progressing  2/13/2024 0725 by Brandie Wallace RN  Outcome: Progressing  Goal: Patient vital signs are stable  Description: 1. Assess vital signs - vaginal delivery.  2/13/2024 0726 by Brandie Wallace RN  Outcome: Progressing  2/13/2024 0725 by Brandie Wallace RN  Outcome: Progressing     Problem: PAIN - ADULT  Goal: Verbalizes/displays adequate comfort level or baseline comfort level  Description:  Interventions:  - Encourage patient to monitor pain and request assistance  - Assess pain using appropriate pain scale  - Administer analgesics based on type and severity of pain and evaluate response  - Implement non-pharmacological measures as appropriate and evaluate response  - Consider cultural and social influences on pain and pain management  - Notify physician/advanced practitioner if interventions unsuccessful or patient reports new pain  Outcome: Progressing     Problem: INFECTION - ADULT  Goal: Absence or prevention of progression during hospitalization  Description: INTERVENTIONS:  - Assess and monitor for signs and symptoms of infection  - Monitor lab/diagnostic results  - Monitor all insertion sites, i.e. indwelling lines, tubes, and drains  - Monitor endotracheal if appropriate and nasal secretions for changes in amount and color  - [place] appropriate cooling/warming therapies per order  - Administer medications as ordered  - Instruct and encourage patient and family to use good hand hygiene technique  - Identify and instruct in appropriate isolation precautions for identified infection/condition  Outcome: Progressing  Goal: Absence of fever/infection during neutropenic period  Description: INTERVENTIONS:  - Monitor WBC    Outcome: Progressing     Problem: SAFETY ADULT  Goal: Patient will remain free of falls  Description: INTERVENTIONS:  - Educate patient/family on patient safety including physical limitations  - Instruct patient to call for assistance with activity   - Consult OT/PT to assist with strengthening/mobility   - Keep Call bell within reach  - Keep bed low and locked with side rails adjusted as appropriate  - Keep care items and personal belongings within reach  - Initiate and maintain comfort rounds  - Apply yellow socks and bracelet for high fall risk patients  - Consider moving patient to room near nurses station  Outcome: Progressing  Goal: Maintain or return to baseline ADL  function  Description: INTERVENTIONS:  -  Assess patient's ability to carry out ADLs; assess patient's baseline for ADL function and identify physical deficits which impact ability to perform ADLs (bathing, care of mouth/teeth, toileting, grooming, dressing, etc.)  - Assess/evaluate cause of self-care deficits   - Assess range of motion  - Assess patient's mobility; develop plan if impaired  - Assess patient's need for assistive devices and provide as appropriate  - Encourage maximum independence but intervene and supervise when necessary  - Involve family in performance of ADLs  - Assess for home care needs following discharge   - Consider OT consult to assist with ADL evaluation and planning for discharge  - Provide patient education as appropriate  Outcome: Progressing  Goal: Maintains/Returns to pre admission functional level  Description: INTERVENTIONS:  - Perform AM-PAC 6 Click Basic Mobility/ Daily Activity assessment daily.  - Set and communicate daily mobility goal to care team and patient/family/caregiver.   - Collaborate with rehabilitation services on mobility goals if consulted  - Out of bed for toileting  - Record patient progress and toleration of activity level   Outcome: Progressing     Problem: DISCHARGE PLANNING  Goal: Discharge to home or other facility with appropriate resources  Description: INTERVENTIONS:  - Identify barriers to discharge w/patient and caregiver  - Arrange for needed discharge resources and transportation as appropriate  - Identify discharge learning needs (meds, wound care, etc.)  - Arrange for interpretive services to assist at discharge as needed  - Refer to Case Management Department for coordinating discharge planning if the patient needs post-hospital services based on physician/advanced practitioner order or complex needs related to functional status, cognitive ability, or social support system  Outcome: Progressing

## 2024-02-13 NOTE — OB LABOR/OXYTOCIN SAFETY PROGRESS
Oxytocin Safety Progress Check Note - Beatrice Perry 28 y.o. female MRN: [id number]    Unit/Bed#: -01 Encounter: [id number]    Dose (rommel-units/min) Oxytocin: 12 rommel-units/min  Contraction Frequency (minutes): 1.5-4  Contraction Intensity: Moderate  Uterine Activity Characteristics: Irregular  Cervical Dilation: 6        Cervical Effacement: 70  Fetal Station: -2  Baseline Rate (FHR): 130 bpm  Fetal Heart Rate (FHT): 125 BPM  FHR Category: 2               Vital Signs:   Vitals:    02/13/24 1154   BP: 106/62   Pulse: 93   Resp:    Temp:    SpO2:        Notes/comments:   SVE as above. Category II tracing due to intermittent variables that have improve after repositioning. Moderate variability. Continue repositioning and if variables return consider IUPC with amnioinfusion. D/w Dr. Flower Pretty MD 2/13/2024 12:09 PM

## 2024-02-13 NOTE — OB LABOR/OXYTOCIN SAFETY PROGRESS
Oxytocin Safety Progress Check Note - Beatrice Perry 28 y.o. female MRN: [id number]    Unit/Bed#: -01 Encounter: [id number]    Dose (rommel-units/min) Oxytocin: 6 rommel-units/min  Contraction Frequency (minutes): 2-3  Contraction Intensity: Moderate  Uterine Activity Characteristics: Regular  Cervical Dilation: 6        Cervical Effacement: 70  Fetal Station: -1  Baseline Rate (FHR): 140 bpm  Fetal Heart Rate (FHT): 125 BPM  FHR Category: 2               Vital Signs:   Vitals:    02/13/24 1154   BP: 106/62   Pulse: 93   Resp:    Temp:    SpO2:        Notes/comments:   SVE as above, intermittent variables continued and IUPC placed. Will start amnioinfusion. Pitocin turned down to 6. D/w Dr. Flower Pretty MD 2/13/2024 12:33 PM

## 2024-02-13 NOTE — OB LABOR/OXYTOCIN SAFETY PROGRESS
Labor Progress Note - Beatrice Perry 28 y.o. female MRN: [id number]    Unit/Bed#: -01 Encounter: [id number]       Contraction Frequency (minutes): 5-6  Contraction Intensity: Mild  Uterine Activity Characteristics: Irregular  Cervical Dilation: 4        Cervical Effacement: 60  Fetal Station: -3  Baseline Rate (FHR): 130 bpm  Fetal Heart Rate (FHT): 144 BPM  FHR Category: I               Vital Signs:   Vitals:    02/12/24 2350   BP: 119/71   Pulse: 75   Resp:    Temp:        Notes/comments:   Beatrice is feeling well after cain balloon was expelled, SVE 4/60/-3. Plan to start pitocin titration. FHR category I.    Rula Russell MD 2/13/2024 5:52 AM

## 2024-02-13 NOTE — ANESTHESIA PROCEDURE NOTES
Epidural Block    Patient location during procedure: OB/L&D  Start time: 2/13/2024 10:14 AM  Reason for block: procedure for pain  Staffing  Performed by: Daniel Kern CRNA  Authorized by: Henrique Velazquez MD    Preanesthetic Checklist  Completed: patient identified, IV checked, site marked, risks and benefits discussed, surgical consent, monitors and equipment checked, pre-op evaluation and timeout performed  Epidural  Patient position: sitting  Prep: ChloraPrep  Sedation Level: no sedation  Patient monitoring: frequent blood pressure checks, continuous pulse oximetry and heart rate  Approach: midline  Location: lumbar, L2-3  Injection technique: WILLIAM saline  Needle  Needle type: Tuohy   Needle gauge: 17 G  Needle insertion depth: 4.5 cm  Catheter type: multi-orifice  Catheter size: 19 G  Catheter at skin depth: 10 cm  Catheter securement method: stabilization device and clear occlusive dressing  Test dose: negativelidocaine-epinephrine (XYLOCAINE-MPF/EPINEPHRINE) 1.5 %-1:200,000 injection 3 mL - Epidural, Back   3 mL - 2/13/2024 10:17:00 AM  Assessment  Sensory level: T4  Number of attempts: 1negative aspiration for CSF, negative aspiration for heme and no paresthesia on injection  patient tolerated the procedure well with no immediate complications

## 2024-02-13 NOTE — OB LABOR/OXYTOCIN SAFETY PROGRESS
Oxytocin Safety Progress Check Note - Beatrice Perry 28 y.o. female MRN: [id number]    Unit/Bed#: -01 Encounter: [id number]    Dose (rommel-units/min) Oxytocin: 8 rommel-units/min  Contraction Frequency (minutes): 1.5-5  Contraction Intensity: Moderate  Uterine Activity Characteristics: Regular  Cervical Dilation: 7        Cervical Effacement: 80  Fetal Station: 0  Baseline Rate (FHR): 135 bpm  Fetal Heart Rate (FHT): 121 BPM  FHR Category: 2               Vital Signs:   Vitals:    02/13/24 1425   BP: 98/53   Pulse: 71   Resp:    Temp:    SpO2:        Notes/comments:   SVE as above. Category II tracing due to intermittent variable decelerations with moderate variability. Amnioinfusion with normal output.   Continue pitocin titration. D/w Dr. Flower Pretty MD 2/13/2024 2:43 PM

## 2024-02-13 NOTE — H&P
H & P- Obstetrics   Beatrice Perry 28 y.o. female MRN: [id number]  Unit/Bed#:  TRIAGE 3- Encounter: [id number]      Assessment/Plan:    Beatrice is a 28 y.o.  at 41w0d admitted for an induction of labor    SVE: Cervical Dilation: Fingertip  Cervical Effacement: 50  Cervical Consistency: Soft  Fetal Station: -3  OB Examiner: JSS    Encounter for induction of labor  Assessment & Plan  Admit to OBGYN   Clear liquid diet   F/u T&S, CBC, RPR   IVF LR 125cc/hr   Continuous fetal monitoring and tocometry   Analgesia at maternal request   Vertex by TAUS  GBS negative  Induction plan cain balloon and cytotec         [facility]  This patient will be an INPATIENT  and I certify the anticipated length of stay is >2 Midnights  Discussed with Dr. Hernandez      SUBJECTIVE:    Chief Complaint: I am here for my induction    HPI: Beatrice Perry is a 28 y.o.  with an JAYESH of 2024, by Last Menstrual Period at 41w0d who is being admitted for induction of labor. She denies having uterine contractions, has no LOF, and reports no VB. She states she has felt good FM. This pregnancy is uncomplicated. All other review of systems is negative.       Pregnancy Plan:  Pregnancy: [place]  Fetal sex: Male  Support person: Colby Perry     Delivery Plans  Planned delivery location: [facility] L&D  Planned anesthesia: None  Acceptable blood products: All     Post-Delivery Plans  Feeding intentions: Breast Milk  Circumcision requested: Provider Performed      Patient Active Problem List   Diagnosis    PCOS (polycystic ovarian syndrome)    40 weeks gestation of pregnancy    Encounter for supervision of normal first pregnancy in third trimester    Sciatic leg pain    Encounter for induction of labor       OB History    Para Term  AB Living   1 0 0 0 0 0   SAB IAB Ectopic Multiple Live Births   0 0 0 0 0      # Outcome Date GA Lbr Jarad/2nd Weight Sex Delivery Anes PTL Lv   1 Current                 Past Medical History:   Diagnosis Date    Varicella     twice       History reviewed. No pertinent surgical history.    Social History     Tobacco Use    Smoking status: Former     Current packs/day: 0.25     Average packs/day: 0.3 packs/day for 1 year (0.3 ttl pk-yrs)     Types: Cigarettes    Smokeless tobacco: Never   Substance Use Topics    Alcohol use: Not Currently     Alcohol/week: 1.0 standard drink of alcohol     Types: 1 Glasses of wine per week     Comment: once a week one glass of wine       Allergies   Allergen Reactions    Escitalopram Rash    Fluoxetine Rash    Pollen Extract Sneezing    Amoxicillin-Pot Clavulanate Itching and Rash    Cefixime Rash    Levonorgestrel Rash       Medications Prior to Admission   Medication    Prenatal Vit-Fe Fumarate-FA (prenatal vitamin) 28-0.8 mg           OBJECTIVE:  Vitals:  Temp:  [98.7 °F (37.1 °C)] 98.7 °F (37.1 °C)  HR:  [99] 99  Resp:  [18] 18  BP: (118)/(72) 118/72  Body mass index is 31.55 kg/m².     Physical Exam:  General: Well appearing, no distress  Respiratory: Unlabored breathing  Cardiovascular: Regular rate.  Abdomen: Soft, gravid, nontender  Fundal Height: Appropriate for gestational age.  Extremities: Warm and well perfused.  Non tender.  Psychiatric: Behavioral normal      FHT:  Baseline 135, moderate variability, 15x15 accelerations present, absent decelerations    TOCO:   Occasional contractions      Prenatal Labs:   I have personally reviewed pertinent reports.  Blood Type:   Lab Results   Component Value Date/Time    ABO Grouping O 07/15/2023 11:00 AM   D (Rh type):   Lab Results   Component Value Date/Time    Rh Factor Positive 07/15/2023 11:00 AM   Antibody Screen: Negative  HCT/HGB:   Lab Results   Component Value Date/Time    Hematocrit 40.1 01/26/2024 12:05 PM    Hemoglobin 13.5 01/26/2024 12:05 PM   MCV:   Lab Results   Component Value Date/Time    MCV 90 01/26/2024 12:05 PM   Platelets:   Lab Results   Component Value Date/Time     "Platelets 276 01/26/2024 12:05 PM   1 hour Glucola:   Lab Results   Component Value Date/Time    Glucose 84 11/03/2023 12:11 PM   Varicella: unknown  Rubella:   Lab Results   Component Value Date/Time    Rubella IgG Quant 50.3 07/15/2023 11:00 AM   VDRL/RPR: Non reactive   Urine Culture/Screen:   Lab Results   Component Value Date/Time    Urine Culture >100,000 cfu/ml Lactobacillus species (A) 07/15/2023 11:00 AM   Hep B:   Lab Results   Component Value Date/Time    Hepatitis B Surface Ag Non-reactive 07/15/2023 11:00 AM   Hep C: Non reactive  HIV: Non reactive  Chlamydia: Negative  Gonorrhea:   Lab Results   Component Value Date/Time    N gonorrhoeae, DNA Probe Negative 07/28/2023 09:59 AM   Group B Strep:    Lab Results   Component Value Date/Time    Strep Grp B PCR Negative 01/12/2024 11:12 AM            Rula Russell MD  2/12/2024  9:55 PM        Portions of the record may have been created with voice recognition software.  Occasional wrong word or \"sound a like\" substitutions may have occurred due to the inherent limitations of voice recognition software.  Read the chart carefully and recognize, using context, where substitutions have occurred    "

## 2024-02-13 NOTE — OB LABOR/OXYTOCIN SAFETY PROGRESS
Oxytocin Safety Progress Check Note - Beatrice Perry 28 y.o. female MRN: [id number]    Unit/Bed#: -01 Encounter: [id number]    Dose (rommel-units/min) Oxytocin: 10 rommel-units/min  Contraction Frequency (minutes): 1.5-2  Contraction Intensity: Moderate  Uterine Activity Characteristics: Regular  Cervical Dilation: 8-9        Cervical Effacement: 80  Fetal Station: 0  Baseline Rate (FHR): 145 bpm  Fetal Heart Rate (FHT): 121 BPM  FHR Category: 1               Vital Signs:   Vitals:    02/13/24 1634   BP: 127/70   Pulse: 70   Resp:    Temp:    SpO2:        Notes/comments:   Continue titrating pitocin.      Christine Morris MD 2/13/2024 4:47 PM

## 2024-02-13 NOTE — OB LABOR/OXYTOCIN SAFETY PROGRESS
Oxytocin Safety Progress Check Note - Beatrice Perry 28 y.o. female MRN: [id number]    Unit/Bed#: -01 Encounter: [id number]    Dose (rommel-units/min) Oxytocin: 10 rommel-units/min  Contraction Frequency (minutes): 1.5-2  Contraction Intensity: Mild  Uterine Activity Characteristics: Regular  Cervical Dilation: 5        Cervical Effacement: 60  Fetal Station: -2  Baseline Rate (FHR): 140 bpm  Fetal Heart Rate (FHT): 150 BPM  FHR Category: 1               Vital Signs:   Vitals:    02/13/24 0810   BP:    Pulse: 78   Resp:    Temp:        Notes/comments:   Continue titrating pitocin as appropriate.      Christine Morris MD 2/13/2024 8:38 AM

## 2024-02-13 NOTE — OB LABOR/OXYTOCIN SAFETY PROGRESS
Labor Progress Note - Beatrice Perry 28 y.o. female MRN: [id number]    Unit/Bed#: LD TRIAGE 3-01 Encounter: [id number]                Cervical Dilation: Fingertip        Cervical Effacement: 50  Fetal Station: -3        FHR Category: I               Vital Signs:   Vitals:    02/12/24 2049   BP: 118/72   Pulse: 99   Resp: 18   Temp: 98.7 °F (37.1 °C)       Notes/comments:   Patient resting comfortably, cain balloon placed as below.    PROCEDURE:  CAIN BALLOON PLACEMENT    A 24F cain with a 30cc balloon was selected, SVE was performed and cervix was located, cain was introduced over sterile gloved hands. Balloon advanced through cervix beyond the internal cervical os. A small amount amount of sterile saline solution was instilled in the balloon to confirm placement. Placement was confirmed to be beyond the internal cervical os. A total of 60cc of sterile saline solution was placed into the balloon. Pt tolerated well. Notify MD when cain dislodged.    After cain balloon 25 mcg vaginal cytotec placed.    Rula Russell MD 2/12/2024 11:08 PM

## 2024-02-13 NOTE — OB LABOR/OXYTOCIN SAFETY PROGRESS
Oxytocin Safety Progress Check Note - Beatrice Perry 28 y.o. female MRN: [id number]    Unit/Bed#: -01 Encounter: [id number]    Dose (rommel-units/min) Oxytocin: 6 rommel-units/min  Contraction Frequency (minutes): 1.5-2  Contraction Intensity: Moderate  Uterine Activity Characteristics: Regular  Cervical Dilation: 10  Dilation Complete Date: 02/13/24  Dilation Complete Time: 1836  Cervical Effacement: 100  Fetal Station: 3  Baseline Rate (FHR): 160 bpm  Fetal Heart Rate (FHT): 121 BPM  FHR Category: 2 for fetal tachycardia              Vital Signs:   Vitals:    02/13/24 1834   BP:    Pulse:    Resp:    Temp: (!) 100.8 °F (38.2 °C)   SpO2:        Notes/comments:   Pt with Cat 2 tracing currently and meets criteria for intraamniotic infection with 2 fevers and fetal tachycardia.  Despite the tachycardia, FHT overall reassuring with moderate variability and accels  She is now 10/100/+3  Giving 500 cc fluid bolus and tylenol now.  Abx ordered  Will begin pushing after tylenol administration    Alina Donohue DO 2/13/2024 6:42 PM

## 2024-02-14 PROBLEM — O41.1290 CHORIOAMNIONITIS: Status: ACTIVE | Noted: 2024-02-14

## 2024-02-14 PROCEDURE — 99024 POSTOP FOLLOW-UP VISIT: CPT | Performed by: STUDENT IN AN ORGANIZED HEALTH CARE EDUCATION/TRAINING PROGRAM

## 2024-02-14 RX ADMIN — DOCUSATE SODIUM 100 MG: 100 CAPSULE, LIQUID FILLED ORAL at 08:09

## 2024-02-14 RX ADMIN — IBUPROFEN 600 MG: 600 TABLET, FILM COATED ORAL at 08:09

## 2024-02-14 RX ADMIN — DOCUSATE SODIUM 100 MG: 100 CAPSULE, LIQUID FILLED ORAL at 17:17

## 2024-02-14 NOTE — PLAN OF CARE
Problem: Knowledge Deficit  Goal: Verbalizes understanding of labor plan  Description: Assess patient/family/caregiver's baseline knowledge level and ability to understand information.  Provide education via patient/family/caregiver's preferred learning method at appropriate level of understanding.     1. Provide teaching at level of understanding.  2. Provide teaching via preferred learning method(s).  Outcome: Progressing  Goal: Patient/family/caregiver demonstrates understanding of disease process, treatment plan, medications, and discharge instructions  Description: Complete learning assessment and assess knowledge base.  Interventions:  - Provide teaching at level of understanding  - Provide teaching via preferred learning methods  Outcome: Progressing     Problem: Labor & Delivery  Goal: Manages discomfort  Description: Assess and monitor for signs and symptoms of discomfort.  Assess patient's pain level regularly and per hospital policy.  Administer medications as ordered. Support use of nonpharmacological methods to help control pain such as distraction, imagery, relaxation, and application of heat and cold.  Collaborate with interdisciplinary team and patient to determine appropriate pain management plan.    1. Include patient in decisions related to comfort.  2. Offer non-pharmacological pain management interventions.  3. Report ineffective pain management to physician.  Outcome: Progressing  Goal: Patient vital signs are stable  Description: 1. Assess vital signs - vaginal delivery.  Outcome: Progressing     Problem: PAIN - ADULT  Goal: Verbalizes/displays adequate comfort level or baseline comfort level  Description: Interventions:  - Encourage patient to monitor pain and request assistance  - Assess pain using appropriate pain scale  - Administer analgesics based on type and severity of pain and evaluate response  - Implement non-pharmacological measures as appropriate and evaluate response  -  Consider cultural and social influences on pain and pain management  - Notify physician/advanced practitioner if interventions unsuccessful or patient reports new pain  Outcome: Progressing     Problem: INFECTION - ADULT  Goal: Absence or prevention of progression during hospitalization  Description: INTERVENTIONS:  - Assess and monitor for signs and symptoms of infection  - Monitor lab/diagnostic results  - Monitor all insertion sites, i.e. indwelling lines, tubes, and drains  - Monitor endotracheal if appropriate and nasal secretions for changes in amount and color  - [place] appropriate cooling/warming therapies per order  - Administer medications as ordered  - Instruct and encourage patient and family to use good hand hygiene technique  - Identify and instruct in appropriate isolation precautions for identified infection/condition  Outcome: Progressing  Goal: Absence of fever/infection during neutropenic period  Description: INTERVENTIONS:  - Monitor WBC    Outcome: Progressing     Problem: SAFETY ADULT  Goal: Patient will remain free of falls  Description: INTERVENTIONS:  - Educate patient/family on patient safety including physical limitations  - Instruct patient to call for assistance with activity   - Consult OT/PT to assist with strengthening/mobility   - Keep Call bell within reach  - Keep bed low and locked with side rails adjusted as appropriate  - Keep care items and personal belongings within reach  - Initiate and maintain comfort rounds  - Make Fall Risk Sign visible to staff  - Offer Toileting every  Hours, in advance of need  - Initiate/Maintain alarm  - Obtain necessary fall risk management equipment:   - Apply yellow socks and bracelet for high fall risk patients  - Consider moving patient to room near nurses station  Outcome: Progressing  Goal: Maintain or return to baseline ADL function  Description: INTERVENTIONS:  -  Assess patient's ability to carry out ADLs; assess patient's baseline for  ADL function and identify physical deficits which impact ability to perform ADLs (bathing, care of mouth/teeth, toileting, grooming, dressing, etc.)  - Assess/evaluate cause of self-care deficits   - Assess range of motion  - Assess patient's mobility; develop plan if impaired  - Assess patient's need for assistive devices and provide as appropriate  - Encourage maximum independence but intervene and supervise when necessary  - Involve family in performance of ADLs  - Assess for home care needs following discharge   - Consider OT consult to assist with ADL evaluation and planning for discharge  - Provide patient education as appropriate  Outcome: Progressing  Goal: Maintains/Returns to pre admission functional level  Description: INTERVENTIONS:  - Perform AM-PAC 6 Click Basic Mobility/ Daily Activity assessment daily.  - Set and communicate daily mobility goal to care team and patient/family/caregiver.   - Collaborate with rehabilitation services on mobility goals if consulted  - Perform Range of Motion  times a day.  - Reposition patient every  hours.  - Dangle patient  times a day  - Stand patient  times a day  - Ambulate patient  times a day  - Out of bed to chair  times a day   - Out of bed for meals  times a day  - Out of bed for toileting  - Record patient progress and toleration of activity level   Outcome: Progressing     Problem: DISCHARGE PLANNING  Goal: Discharge to home or other facility with appropriate resources  Description: INTERVENTIONS:  - Identify barriers to discharge w/patient and caregiver  - Arrange for needed discharge resources and transportation as appropriate  - Identify discharge learning needs (meds, wound care, etc.)  - Arrange for interpretive services to assist at discharge as needed  - Refer to Case Management Department for coordinating discharge planning if the patient needs post-hospital services based on physician/advanced practitioner order or complex needs related to functional  status, cognitive ability, or social support system  Outcome: Progressing

## 2024-02-14 NOTE — LACTATION NOTE
This note was copied from a baby's chart.  CONSULT - LACTATION  Baby Boy (Shazia Perry 1 days male MRN: [id number]    [facility] AN NURSERY Room / Bed: (N)/(N) Encounter: [id number]    Maternal Information     MOTHER:  Beatrice Perry  Maternal Age: 28 y.o.   OB History: # 1 - Date: 24, Sex: Male, Weight: 3790 g (8 lb 5.7 oz), GA: 41w1d, Delivery: Vaginal, Spontaneous, Apgar1: 8, Apgar5: 9, Living: Living, Birth Comments: None   Previouse breast reduction surgery? No    Lactation history:   Has patient previously breast fed: No   How long had patient previously breast fed:     Previous breast feeding complications:     History reviewed. No pertinent surgical history.     Birth information:  YOB: 2024   Time of birth: 7:35 PM   Sex: male   Delivery type: Vaginal, Spontaneous   Birth Weight: 3790 g (8 lb 5.7 oz)   Percent of Weight Change: 0%     Gestational Age: 41w1d   [unfilled]    Assessment     Breast and nipple assessment:  L breast is round with dark, smaller areola and short shank round nipple     Assessment:  no clinical assessment as mom has baby up to her chest    Feeding assessment: latch difficulty (due to positioning and torso turned away from mom)  LATCH:  Latch: Repeated attempts, hold nipple in mouth, stimulate to suck   Audible Swallowing: Spontaneous and intermittent (24 hours old)   Type of Nipple: Everted (After stimulation)   Comfort (Breast/Nipple): Filling, red/small blisters/bruises, mild/moderate discomfort   Hold (Positioning): Partial assist, teach one side, mother does other, staff holds   LATCH Score: 7          Feeding recommendations:  breast feed on demand. Mom states she plans to excl. Breast feed and only begin pumping intermittently if she is away from the baby.     Mom has a Lansinoh pump from ins.     Ed. On how to est. Milk supply. Ed. On feeding log and what to expect the first 3 days. Ed. On when  milk supply will turn to lactogenesis 2.    Ed. On positioning and alignment. Demonstration and teach back of bringing baby to the breast, not breast to baby. Ed. On U shape hold and alignment of nipple to nose. Enc. Chin deep into the breast tissue and how baby breathes at the breast.     Ed. On pillows to lift the baby tot he breast and rolled blanket between [place] and mom's wrist to support her snug hold of the breast. Demonstration and teach back of breast compressions.    With support, deeper latch noted. Mom states she feels baby tugging. Visible 3-5 suck bursts, pauses between bursts.    Ed. On signs of satiation. & timing of feeds.    RSB/DC reviewed    Enc. To call lactation with questions.      Milk Supply:   - Allow for non-nutritive suck at the breast to stimulate supply   - Allow for skin to skin during and after each breastfeeding session   - Use massage, heat, and hand expression prior to feedings to assist with deep latch   - Increase pumping sessions and pump after every feeding    Discussed with MOB how to utilize feeding log & monitor baby's output. Education on signs of satiation during a feeding, size of baby's belly, and offering both breasts at every feeding session provided.    Education on positioning and alignment. Mom is encouraged to:     - Bring baby up to the breast (use of pillows to elevate so baby's torso is against mom's breasts)   - Skin to skin for feedings with top hand exposed to show signs of satiation   - Chin deep into breast tissue (make baby look up to the nipple)   - nose aligned to the nipple   -Wait for wide gape, drag chin on the breast so nipple is aimed at the upper, back palate  - Cheek should be touching breast   - Deep, firm hold of baby with ear, shoulder, hip alignment    Demonstration and teach back of deeper latch with baby deeper into mom's axilla and baby's chest against the breast. Alignment of ear, shoulder, hip and tucked into mom's arm. Alignment of  "nipple to nose, once wide mouth is achieved, snug hold between the upper shoulders. Take baby to breast, not breast to baby. Active, coordinated sucking achieved. Ed. On breathing and muscle breaks. Ed. On breast compressions, timing of feeds and signs of satiation.     Demonstrated with teach back breast compressions during a feeding to increase milk transfer and stimulate suckling after a breathing/muscle break.     Education on creating a snug hold of your infant to the breast by verifying the infant's cheek is touching the breast, your infant's chin is deep into the breast tissue, your infant's arms are \"hugging\" the breast, and your infant's lips are flanged on the areola. Bring infant to the breast, not your breast to the infant. Latch should feel like a tugging sensation on the nipple.    Reviewed early signs of hunger, including tensing of hands and shoulders - no need to wait for open eyes.  Crying is a late hunger sign.  If baby is crying, soothe baby first and then attempt to latch.      Reviewed normal sucking patterns: transition from stimulation to nutritive to release or non-nutritive. The goal is to see and hear lots of swallowing.    Reviewed normal nursing pattern: infant could latch on one breast up to 30 minutes or until releases on own. Signs of satiation is open hand with fingers that do not grab your finger.  Discussed difference in sensation of non-nutritive v nutritive sucking    Information on hand expression given. Discussed benefits of knowing how to manually express breast including stimulating milk supply, softening nipple for latch and evacuating breast in the event of engorgement.    Mom is encouraged to place baby skin to skin for feedings. Skin to skin education provided for baby placement on mother's chest, baby only in diaper, blankets below shoulders on baby's back. Skin to skin is encouraged to continue at home for feedings and between feedings.    Worked on positioning infant " up at chest level and starting to feed infant with nose arriving at the nipple. Then, using areolar compression to achieve a deep latch that is comfortable and exchanges optimum amounts of milk.     - Start feedings on breast that last feeding ended   - allow no more than 3 hours between breast feeding sessions   - time between feedings is counted from the beginning of the first feed to the beginning of the next feeding session    Reviewed early signs of hunger, including tensing of hands and shoulders - no need to wait for open eyes.  Crying is a late hunger sign.  If baby is crying, soothe baby first and then attempt to latch.  Reviewed normal sucking patterns: transition from stimulation to nutritive to release or non-nutritive. The goal is to see and hear lots of swallowing.    Reviewed normal nursing pattern: infant could latch on one breast up to 30 minutes or until releases on own. Signs of satiation is open hand with fingers that do not grab your finger.  Discussed difference in sensation of non-nutritive v nutritive sucking    Met with mother. Provided mother with Ready, Set, Baby booklet.    Discussed Skin to Skin contact an benefits to mom and baby.  Talked about the delay of the first bath until baby has adjusted. Spoke about the benefits of rooming in. Feeding on cue and what that means for recognizing infant's hunger. Avoidance of pacifiers for the first month discussed. Talked about exclusive breastfeeding for the first 6 months.    Positioning and latch reviewed as well as showing images of other feeding positions.  Discussed the properties of a good latch in any position. Reviewed hand/manual expression.  Discussed s/s that baby is getting enough milk and some s/s that breastfeeding dyad may need further help.    Gave information on common concerns, what to expect the first few weeks after delivery, preparing for other caregivers, and how partners can help. Resources for support also  provided.    Encouraged parents to call for assistance, questions, and concerns about breastfeeding.  Extension provided.    Provided education on growth spurts, when to introduce bottles; paced bottle feeding, and non-nutritive suck at the breast. Provided education on Signs of satiation. Encouraged to call lactation to observe a latch prior to discharge for reassurance. Encouraged to call baby and me with any questions and closely monitor output.      Christy Hume, MA 2/14/2024 10:46 AM

## 2024-02-14 NOTE — PROGRESS NOTES
"Progress Note - OB/GYN  Beatrice Perry 28 y.o. female MRN: [id number]  Unit/Bed#:  322-01 Encounter: [id number]    Assessment and Plan     Beatrice Perry is a patient of: [facility]. She is PPD# 1 s/p  spontaneous vaginal delivery  Recovering well and is stable       Suspicion for Chorioamnionitis  Assessment & Plan  Fetal tachycardia and maternal fever   Tmax 101.8, Tlast  @1834 100.8  S/p gent and clinda  Fundal tenderness noted     *  (spontaneous vaginal delivery)  Assessment & Plan  Lochia WNL   Recovering well   Appropriate bowel and bladder function   Pain well controlled   Tolerating diet   Breastfeeding  Ambulating without issues   No lower extremity tenderness  GBS neg  Rh pos          Disposition    - Anticipate discharge home on PPD# 2      Subjective/Objective     Chief Complaint: Postpartum State     Subjective:    Beatrice Perry is PPD#1 s/p  spontaneous vaginal delivery. She has no current complaints.  Pain is well controlled.  Patient is currently voiding.  She is ambulating.  Patient is currently passing flatus and has had no bowel movement. She is tolerating PO, and denies nausea or vomitting. Patient denies fever, chills, chest pain, shortness of breath, or calf tenderness. Lochia is normal. She is  Breastfeeding. She is recovering well and is stable.       Vitals:   /67 (BP Location: Left arm)   Pulse 90   Temp 98.4 °F (36.9 °C) (Oral)   Resp 20   Ht 5' 1\" (1.549 m)   Wt 75.8 kg (167 lb)   LMP 2023   SpO2 95%   Breastfeeding Yes   BMI 31.55 kg/m²       Intake/Output Summary (Last 24 hours) at 2024 0613  Last data filed at 2024 0421  Gross per 24 hour   Intake 2443.49 ml   Output 3084 ml   Net -640.51 ml       Invasive Devices       Peripheral Intravenous Line  Duration             Peripheral IV 24 Right;Upper;Ventral (anterior) Arm 1 day              Intrauterine Pressure Catheter  Duration             Intrauterine " Pressure Catheter 02/13/24 1240 <1 day                    Physical Exam:   GEN: Beatrice Perry appears well, alert and oriented x 3, pleasant and cooperative   CARDIO: RRR, no murmurs or rubs  RESP:  CTAB, no wheezes or rales  ABDOMEN: soft, no tenderness, no distention, fundus @ U-2 with fundal tenderness   EXTREMITIES: non tender, no erythema, b/l Abby's sign negative      Labs:     Hemoglobin   Date Value Ref Range Status   02/12/2024 12.9 11.5 - 15.4 g/dL Final   01/26/2024 13.5 11.5 - 15.4 g/dL Final     WBC   Date Value Ref Range Status   02/12/2024 11.12 (H) 4.31 - 10.16 Thousand/uL Final   01/26/2024 12.13 (H) 4.31 - 10.16 Thousand/uL Final     Platelets   Date Value Ref Range Status   02/12/2024 312 149 - 390 Thousands/uL Final   01/26/2024 276 149 - 390 Thousands/uL Final     Creatinine   Date Value Ref Range Status   05/05/2023 0.75 0.60 - 1.30 mg/dL Final     Comment:     Standardized to IDMS reference method   03/25/2022 0.70 0.60 - 1.30 mg/dL Final     Comment:     Standardized to IDMS reference method     AST   Date Value Ref Range Status   05/05/2023 15 13 - 39 U/L Final   03/25/2022 14 5 - 45 U/L Final     Comment:     Specimen collection should occur prior to Sulfasalazine administration due to the potential for falsely depressed results.      ALT   Date Value Ref Range Status   05/05/2023 10 7 - 52 U/L Final     Comment:     Specimen collection should occur prior to Sulfasalazine administration due to the potential for falsely depressed results.    03/25/2022 20 12 - 78 U/L Final     Comment:     Specimen collection should occur prior to Sulfasalazine administration due to the potential for falsely depressed results.           Umu Tipton MD  2/14/2024  6:13 AM

## 2024-02-14 NOTE — L&D DELIVERY NOTE
OBGYN Vaginal Delivery Summary  Beatrice Perry 28 y.o. female MRN: [id number]  Unit/Bed#: -01 Encounter: [id number]    Predelivery Diagnosis:  1. Pregnancy at 41w0d gestational age   2. Chorioamnionitis      Postdelivery Diagnosis:  1. Same as above  2. Delivery of fullterm     Procedure: spontaneous vaginal delivery, repair of second degree laceration(s)    Attending: Dr. Donohue     Assistant: Dr. Camelia Garcia    Anesthesia: Epidural    QBL: 34 mL  Admission H.9 g/dL  Admission platelets: 312 thousands/uL    Complications: none apparent    Specimens: cord blood, arterial and venous cord blood gases, placenta to pathology    Findings:   1. Viable male at 1935, with APGARS of 8 and 9 at 1 and 5 minutes respectively. Weight pending at time of dictation.  2. Spontaneous delivery of intact placenta at 1939. central insertion three vessel umbilical cord  3. Second  degree laceration repaired with 3-20 vicryl   4. Blood gases:  Umbilical Cord Venous Blood Gas:  Results from last 7 days   Lab Units 24   PH COV  7.387   PCO2 COV mm HG 36.3   HCO3 COV mmol/L 21.3   BASE EXC COV mmol/L -3.0*   O2 CT CD VB mL/dL 15.9   O2 HGB, VENOUS CORD % 74.5     Umbilical Cord Arterial Blood Gas:  Results from last 7 days   Lab Units 24   PH COA  7.235   PCO2 COA  49.4   PO2 COA mm HG 24.4   HCO3 COA mmol/L 20.5   BASE EXC COA mmol/L -7.3*   O2 CONTENT CORD ART ml/dl 11.4   O2 HGB, ARTERIAL CORD % 52.3       Disposition:  Patient tolerated the procedure well and was recovering in labor and delivery room.    Brief history and labor course:  Beatrice Perry is a 28 y.o.  at 41w1d who was initially admitted for IOL for late term gestation. Cervical exam on admission was 0.5/50/-3.  She was induced with Chanel balloon and Cytotec.  Chanel balloon was dislodged, and cervical exam was 4/60/-3.  Pitocin was started.  Membranes spontaneously ruptured for clear fluid.  She received an epidural  for pain control.  She progressed to 6/70/-2, and an IUPC was placed for improved contraction monitoring in the setting of category 2 FHT with resolved with amnioinfusion.  She then developed a fever, and fetal tachycardia was noted, at which time she was diagnosed with triple I and Gentamicin and Clindamycin were started.  She then progressed to complete dilation and began to push.     Description of procedure  After pushing for 24 minutes, the patient delivered a viable male  at 1935 on 2024, weight pending at time of dictation, apgars of 8 (1 min) and 9 (5 min). The fetal vertex delivered spontaneously. Baby restituted  to [place]. There was one loose nuchal cord that was reduced at time of delivery. The anterior (right) shoulder delivered atraumatically with maternal expulsive forces and the assistance of gentle downward traction. The posterior shoulder delivered with maternal expulsive forces and the assistance of gentle upward traction. The remainder of the fetus delivered spontaneously.     Upon delivery the infant was placed on the mother's abdomen and delayed cord clamping was performed. The umbilical cord was then doubly clamped and cut. The infant was noted to cry spontaneously and was moving all extremities appropriately. There was no evidence for injury. Awaiting nurse resuscitators evaluated the . Arterial and venous cord blood gases and cord blood were collected for analysis and were promptly sent to the lab. In the immediate post-partum, IV pitocin was administered, and the uterus was noted to contract down well with massage and pitocin. The placenta delivered spontaneously at 1939 and was noted to be intact and had a centrally inserted 3 vessel cord. The placenta was sent to storage.    The vagina, cervix, perineum, and rectum were inspected. Second degree laceration(s) were noted. Repair was completed with 3-0 vicryl rapide.    At the conclusion of the procedure, all needle, sponge,  and instrument counts were noted to be correct. Patient tolerated the procedure well and was allowed to recover in labor and delivery room with family and  before being transferred to the post-partum floor.     Dr. Donohue was present and participated in all key portions of the case.    Camelia Garcia MD  2024  7:58 PM

## 2024-02-14 NOTE — ANESTHESIA POSTPROCEDURE EVALUATION
Post-Op Assessment Note    CV Status:  Stable  Pain Score: 0    Pain management: adequate      Post-op block assessment: site cleaned, no complications and catheter intact   Mental Status:  Alert and awake   Hydration Status:  Euvolemic   PONV Controlled:  Controlled   Airway Patency:  Patent     Post Op Vitals Reviewed: Yes    No anethesia notable event occurred.    Staff: CRNA   Comments: vss maex4 no deficits          BP      Temp      Pulse     Resp      SpO2

## 2024-02-14 NOTE — DISCHARGE SUMMARY
Discharge Summary - OB/GYN  Beatrice Perry 28 y.o. female MRN: [id number]  Unit/Bed#: -01 Encounter: [id number]    Admission Date: 2024     Discharge Date: 2/15/2024    Admitting Attending: Kirstin Hernandez DO    Delivering Attending: Dr. Donohue    Discharging Attending: Dr. Tirado  Principal Diagnosis: Pregnancy at 41w1d    Secondary Diagnosis:  Intrauterine inflammation / infection    Procedures: spontaneous vaginal delivery, 2nd degree perineal laceration repair    Anesthesia: epidural    Hospital course:  Beatrice Perry is a 28 y.o.  at 41w1d who was initially admitted for IOL for late term gestation. Cervical exam on admission was 0.5/50/-3.  She was induced with Chanel balloon and Cytotec.  Chanel balloon was dislodged, and cervical exam was 4/60/-3.  Pitocin was started.  Membranes spontaneously ruptured for clear fluid.  She received an epidural for pain control.  She progressed to 6/70/-2, and an IUPC was placed for improved contraction monitoring in the setting of category 2 FHT with resolved with amnioinfusion.  She then developed a fever, and fetal tachycardia was noted, at which time she was diagnosed with triple I and Gentamicin and Clindamycin were started.  She then progressed to complete dilation and began to push.    She delivered a viable male  on 2024 at 1935. Weight 8 lbs 5.7 oz via normal spontaneous vaginal delivery. She sustained a second degree perineal laceration during delivery which was adequately repaired. Apgars were 8 (1 min) and 9 (5 min).  was transferred to  nursery. Patient tolerated the procedure well.     Her post-delivery course was uncomplicated. She was afebrile for >24 hours prior to discharge. Her postpartum pain was well controlled with oral analgesics.    On day of discharge, she was ambulating and able to reasonably perform all ADLs. She was voiding and had appropriate bowel function. Pain was well controlled. She was  discharged home on postpartum day #2 without complications. Patient was instructed to follow up with her OB as an outpatient and was given appropriate warnings to call provider if she develops signs of infection or uncontrolled pain.    Complications: none apparent    Condition at discharge: good     Discharge instructions/Information to patient and family:   See after visit summary for information provided to patient and family.      Provisions for Follow-Up Care:  See after visit summary for information related to follow-up care and any pertinent home health orders.      Disposition: See After Visit Summary for discharge disposition information.    Planned Readmission: No    Discharge medications and instructions:   Please see AVS for full list of medications upon discharge.        Umu Tipton MD  PGY-1 OB/GYN

## 2024-02-14 NOTE — ANESTHESIA PROCEDURE NOTES
Anesthesia Notable Event    Date/Time: 2/14/2024 8:37 AM    Performed by: Henrique Velazquez MD  Authorized by: Henrique Velazquez MD

## 2024-02-14 NOTE — PLAN OF CARE
Problem: Knowledge Deficit  Goal: Verbalizes understanding of labor plan  Description: Assess patient/family/caregiver's baseline knowledge level and ability to understand information.  Provide education via patient/family/caregiver's preferred learning method at appropriate level of understanding.     1. Provide teaching at level of understanding.  2. Provide teaching via preferred learning method(s).  Outcome: Progressing  Goal: Patient/family/caregiver demonstrates understanding of disease process, treatment plan, medications, and discharge instructions  Description: Complete learning assessment and assess knowledge base.  Interventions:  - Provide teaching at level of understanding  - Provide teaching via preferred learning methods  Outcome: Progressing     Problem: Labor & Delivery  Goal: Manages discomfort  Description: Assess and monitor for signs and symptoms of discomfort.  Assess patient's pain level regularly and per hospital policy.  Administer medications as ordered. Support use of nonpharmacological methods to help control pain such as distraction, imagery, relaxation, and application of heat and cold.  Collaborate with interdisciplinary team and patient to determine appropriate pain management plan.    1. Include patient in decisions related to comfort.  2. Offer non-pharmacological pain management interventions.  3. Report ineffective pain management to physician.  Outcome: Progressing  Goal: Patient vital signs are stable  Description: 1. Assess vital signs - vaginal delivery.  Outcome: Progressing     Problem: PAIN - ADULT  Goal: Verbalizes/displays adequate comfort level or baseline comfort level  Description: Interventions:  - Encourage patient to monitor pain and request assistance  - Assess pain using appropriate pain scale  - Administer analgesics based on type and severity of pain and evaluate response  - Implement non-pharmacological measures as appropriate and evaluate response  -  Consider cultural and social influences on pain and pain management  - Notify physician/advanced practitioner if interventions unsuccessful or patient reports new pain  Outcome: Progressing     Problem: INFECTION - ADULT  Goal: Absence or prevention of progression during hospitalization  Description: INTERVENTIONS:  - Assess and monitor for signs and symptoms of infection  - Monitor lab/diagnostic results  - Monitor all insertion sites, i.e. indwelling lines, tubes, and drains  - Monitor endotracheal if appropriate and nasal secretions for changes in amount and color  - [place] appropriate cooling/warming therapies per order  - Administer medications as ordered  - Instruct and encourage patient and family to use good hand hygiene technique  - Identify and instruct in appropriate isolation precautions for identified infection/condition  Outcome: Progressing  Goal: Absence of fever/infection during neutropenic period  Description: INTERVENTIONS:  - Monitor WBC    Outcome: Progressing     Problem: SAFETY ADULT  Goal: Patient will remain free of falls  Description: INTERVENTIONS:  - Educate patient/family on patient safety including physical limitations  - Instruct patient to call for assistance with activity   - Consult OT/PT to assist with strengthening/mobility   - Keep Call bell within reach  - Keep bed low and locked with side rails adjusted as appropriate  - Keep care items and personal belongings within reach  - Initiate and maintain comfort rounds  - Make Fall Risk Sign visible to staff  - Offer Toileting every  Hours, in advance of need  - Initiate/Maintain alarm  - Obtain necessary fall risk management equipment:   - Apply yellow socks and bracelet for high fall risk patients  - Consider moving patient to room near nurses station  Outcome: Progressing  Goal: Maintain or return to baseline ADL function  Description: INTERVENTIONS:  -  Assess patient's ability to carry out ADLs; assess patient's baseline for  ADL function and identify physical deficits which impact ability to perform ADLs (bathing, care of mouth/teeth, toileting, grooming, dressing, etc.)  - Assess/evaluate cause of self-care deficits   - Assess range of motion  - Assess patient's mobility; develop plan if impaired  - Assess patient's need for assistive devices and provide as appropriate  - Encourage maximum independence but intervene and supervise when necessary  - Involve family in performance of ADLs  - Assess for home care needs following discharge   - Consider OT consult to assist with ADL evaluation and planning for discharge  - Provide patient education as appropriate  Outcome: Progressing  Goal: Maintains/Returns to pre admission functional level  Description: INTERVENTIONS:  - Perform AM-PAC 6 Click Basic Mobility/ Daily Activity assessment daily.  - Set and communicate daily mobility goal to care team and patient/family/caregiver.   - Collaborate with rehabilitation services on mobility goals if consulted  - Perform Range of Motion  times a day.  - Reposition patient every  hours.  - Dangle patient  times a day  - Stand patient  times a day  - Ambulate patient  times a day  - Out of bed to chair  times a day   - Out of bed for meals  times a day  - Out of bed for toileting  - Record patient progress and toleration of activity level   Outcome: Progressing     Problem: DISCHARGE PLANNING  Goal: Discharge to home or other facility with appropriate resources  Description: INTERVENTIONS:  - Identify barriers to discharge w/patient and caregiver  - Arrange for needed discharge resources and transportation as appropriate  - Identify discharge learning needs (meds, wound care, etc.)  - Arrange for interpretive services to assist at discharge as needed  - Refer to Case Management Department for coordinating discharge planning if the patient needs post-hospital services based on physician/advanced practitioner order or complex needs related to functional  status, cognitive ability, or social support system  Outcome: Progressing

## 2024-02-15 ENCOUNTER — TRANSITIONAL CARE MANAGEMENT (OUTPATIENT)
Dept: FAMILY MEDICINE CLINIC | Facility: CLINIC | Age: 29
End: 2024-02-15

## 2024-02-15 VITALS
HEIGHT: 61 IN | WEIGHT: 167 LBS | TEMPERATURE: 97.7 F | DIASTOLIC BLOOD PRESSURE: 59 MMHG | BODY MASS INDEX: 31.53 KG/M2 | SYSTOLIC BLOOD PRESSURE: 115 MMHG | RESPIRATION RATE: 18 BRPM | OXYGEN SATURATION: 98 % | HEART RATE: 61 BPM

## 2024-02-15 PROCEDURE — 99024 POSTOP FOLLOW-UP VISIT: CPT | Performed by: STUDENT IN AN ORGANIZED HEALTH CARE EDUCATION/TRAINING PROGRAM

## 2024-02-15 RX ORDER — BENZOCAINE/MENTHOL 6 MG-10 MG
1 LOZENGE MUCOUS MEMBRANE DAILY PRN
Start: 2024-02-15

## 2024-02-15 RX ORDER — ACETAMINOPHEN 325 MG/1
650 TABLET ORAL EVERY 4 HOURS PRN
Start: 2024-02-15

## 2024-02-15 RX ORDER — IBUPROFEN 600 MG/1
600 TABLET ORAL EVERY 6 HOURS
Start: 2024-02-15

## 2024-02-15 RX ADMIN — DOCUSATE SODIUM 100 MG: 100 CAPSULE, LIQUID FILLED ORAL at 08:18

## 2024-02-15 RX ADMIN — BENZOCAINE AND LEVOMENTHOL 1 APPLICATION: 200; 5 SPRAY TOPICAL at 08:28

## 2024-02-15 NOTE — PROGRESS NOTES
"Progress Note - OB/GYN  Beatrice Perry 28 y.o. female MRN: [id number]  Unit/Bed#:  322-01 Encounter: [id number]    Assessment and Plan     Beatrice Perry is a patient of: [facility]. She is PPD# 2 s/p  spontaneous vaginal delivery  Recovering well and is stable       Suspicion for Chorioamnionitis  Assessment & Plan  Fetal tachycardia and maternal fever   Tmax 101.8, Tlast  @1834 100.8  S/p gent and clinda  Fundal tenderness noted     *  (spontaneous vaginal delivery)  Assessment & Plan  Lochia WNL   Recovering well   Appropriate bowel and bladder function   Pain well controlled   Tolerating diet   Breastfeeding  Ambulating without issues   No lower extremity tenderness  GBS neg  Rh pos          Disposition    - Anticipate discharge home on PPD# 2      Subjective/Objective     Chief Complaint: Postpartum State     Subjective:    Beatrice Perry is PPD#2 s/p  spontaneous vaginal delivery. She has no current complaints.  Pain is well controlled.  Patient is currently voiding.  She is ambulating.  Patient is currently passing flatus and has had no bowel movement. She is tolerating PO, and denies nausea or vomitting. Patient denies fever, chills, chest pain, shortness of breath, or calf tenderness. Lochia is normal. She is  Breastfeeding. She is recovering well and is stable.       Vitals:   /74 (BP Location: Left arm)   Pulse 69   Temp 97.8 °F (36.6 °C) (Oral)   Resp 18   Ht 5' 1\" (1.549 m)   Wt 75.8 kg (167 lb)   LMP 2023   SpO2 98%   Breastfeeding Yes   BMI 31.55 kg/m²     No intake or output data in the 24 hours ending 02/15/24 0657      Invasive Devices       Intrauterine Pressure Catheter  Duration             Intrauterine Pressure Catheter 24 1240 1 day                    Physical Exam:   GEN: Beatrice Perry appears well, alert and oriented x 3, pleasant and cooperative   CARDIO: Reg rate  RESP:  non-labored  ABDOMEN: soft, no tenderness, no " distention, fundus @ U-2 with fundal tenderness   EXTREMITIES: non tender, no erythema, b/l Abby's sign negative      Labs:     Hemoglobin   Date Value Ref Range Status   02/12/2024 12.9 11.5 - 15.4 g/dL Final   01/26/2024 13.5 11.5 - 15.4 g/dL Final     WBC   Date Value Ref Range Status   02/12/2024 11.12 (H) 4.31 - 10.16 Thousand/uL Final   01/26/2024 12.13 (H) 4.31 - 10.16 Thousand/uL Final     Platelets   Date Value Ref Range Status   02/12/2024 312 149 - 390 Thousands/uL Final   01/26/2024 276 149 - 390 Thousands/uL Final     Creatinine   Date Value Ref Range Status   05/05/2023 0.75 0.60 - 1.30 mg/dL Final     Comment:     Standardized to IDMS reference method   03/25/2022 0.70 0.60 - 1.30 mg/dL Final     Comment:     Standardized to IDMS reference method     AST   Date Value Ref Range Status   05/05/2023 15 13 - 39 U/L Final   03/25/2022 14 5 - 45 U/L Final     Comment:     Specimen collection should occur prior to Sulfasalazine administration due to the potential for falsely depressed results.      ALT   Date Value Ref Range Status   05/05/2023 10 7 - 52 U/L Final     Comment:     Specimen collection should occur prior to Sulfasalazine administration due to the potential for falsely depressed results.    03/25/2022 20 12 - 78 U/L Final     Comment:     Specimen collection should occur prior to Sulfasalazine administration due to the potential for falsely depressed results.           Umu Tipton MD  2/15/2024  6:57 AM

## 2024-02-15 NOTE — PLAN OF CARE
Problem: Knowledge Deficit  Goal: Verbalizes understanding of labor plan  Description: Assess patient/family/caregiver's baseline knowledge level and ability to understand information.  Provide education via patient/family/caregiver's preferred learning method at appropriate level of understanding.     1. Provide teaching at level of understanding.  2. Provide teaching via preferred learning method(s).  Outcome: Progressing  Goal: Patient/family/caregiver demonstrates understanding of disease process, treatment plan, medications, and discharge instructions  Description: Complete learning assessment and assess knowledge base.  Interventions:  - Provide teaching at level of understanding  - Provide teaching via preferred learning methods  Outcome: Progressing     Problem: PAIN - ADULT  Goal: Verbalizes/displays adequate comfort level or baseline comfort level  Description: Interventions:  - Encourage patient to monitor pain and request assistance  - Assess pain using appropriate pain scale  - Administer analgesics based on type and severity of pain and evaluate response  - Implement non-pharmacological measures as appropriate and evaluate response  - Consider cultural and social influences on pain and pain management  - Notify physician/advanced practitioner if interventions unsuccessful or patient reports new pain  Outcome: Progressing     Problem: INFECTION - ADULT  Goal: Absence or prevention of progression during hospitalization  Description: INTERVENTIONS:  - Assess and monitor for signs and symptoms of infection  - Monitor lab/diagnostic results  - Monitor all insertion sites, i.e. indwelling lines, tubes, and drains  - Monitor endotracheal if appropriate and nasal secretions for changes in amount and color  - [place] appropriate cooling/warming therapies per order  - Administer medications as ordered  - Instruct and encourage patient and family to use good hand hygiene technique  - Identify and instruct in  appropriate isolation precautions for identified infection/condition  Outcome: Progressing  Goal: Absence of fever/infection during neutropenic period  Description: INTERVENTIONS:  - Monitor WBC    Outcome: Progressing     Problem: SAFETY ADULT  Goal: Patient will remain free of falls  Description: INTERVENTIONS:  - Educate patient/family on patient safety including physical limitations  - Instruct patient to call for assistance with activity   - Consult OT/PT to assist with strengthening/mobility   - Keep Call bell within reach  - Keep bed low and locked with side rails adjusted as appropriate  - Keep care items and personal belongings within reach  - Initiate and maintain comfort rounds  - Make Fall Risk Sign visible to staff  - Offer Toileting every  Hours, in advance of need  - Initiate/Maintain alarm  - Obtain necessary fall risk management equipment:   - Apply yellow socks and bracelet for high fall risk patients  - Consider moving patient to room near nurses station  Outcome: Progressing  Goal: Maintain or return to baseline ADL function  Description: INTERVENTIONS:  -  Assess patient's ability to carry out ADLs; assess patient's baseline for ADL function and identify physical deficits which impact ability to perform ADLs (bathing, care of mouth/teeth, toileting, grooming, dressing, etc.)  - Assess/evaluate cause of self-care deficits   - Assess range of motion  - Assess patient's mobility; develop plan if impaired  - Assess patient's need for assistive devices and provide as appropriate  - Encourage maximum independence but intervene and supervise when necessary  - Involve family in performance of ADLs  - Assess for home care needs following discharge   - Consider OT consult to assist with ADL evaluation and planning for discharge  - Provide patient education as appropriate  Outcome: Progressing  Goal: Maintains/Returns to pre admission functional level  Description: INTERVENTIONS:  - Perform AM-PAC 6 Click  Basic Mobility/ Daily Activity assessment daily.  - Set and communicate daily mobility goal to care team and patient/family/caregiver.   - Collaborate with rehabilitation services on mobility goals if consulted  - Perform Range of Motion  times a day.  - Reposition patient every  hours.  - Dangle patient  times a day  - Stand patient  times a day  - Ambulate patient  times a day  - Out of bed to chair  times a day   - Out of bed for meals  times a day  - Out of bed for toileting  - Record patient progress and toleration of activity level   Outcome: Progressing     Problem: DISCHARGE PLANNING  Goal: Discharge to home or other facility with appropriate resources  Description: INTERVENTIONS:  - Identify barriers to discharge w/patient and caregiver  - Arrange for needed discharge resources and transportation as appropriate  - Identify discharge learning needs (meds, wound care, etc.)  - Arrange for interpretive services to assist at discharge as needed  - Refer to Case Management Department for coordinating discharge planning if the patient needs post-hospital services based on physician/advanced practitioner order or complex needs related to functional status, cognitive ability, or social support system  Outcome: Progressing     Problem: POSTPARTUM  Goal: Experiences normal postpartum course  Description: INTERVENTIONS:  - Monitor maternal vital signs  - Assess uterine involution and lochia  Outcome: Progressing  Goal: Appropriate maternal -  bonding  Description: INTERVENTIONS:  - Identify family support  - Assess for appropriate maternal/infant bonding   -Encourage maternal/infant bonding opportunities  - Referral to  or  as needed  Outcome: Progressing  Goal: Establishment of infant feeding pattern  Description: INTERVENTIONS:  - Assess breast/bottle feeding  - Refer to lactation as needed  Outcome: Progressing  Goal: Incision(s), wounds(s) or drain site(s) healing without S/S of  infection  Description: INTERVENTIONS  - Assess and document dressing, incision, wound bed, drain sites and surrounding tissue  - Provide patient and family education  - Perform skin care/dressing changes every   Outcome: Progressing

## 2024-02-15 NOTE — PLAN OF CARE
Problem: Knowledge Deficit  Goal: Verbalizes understanding of labor plan  Description: Assess patient/family/caregiver's baseline knowledge level and ability to understand information.  Provide education via patient/family/caregiver's preferred learning method at appropriate level of understanding.     1. Provide teaching at level of understanding.  2. Provide teaching via preferred learning method(s).  Outcome: Completed  Goal: Patient/family/caregiver demonstrates understanding of disease process, treatment plan, medications, and discharge instructions  Description: Complete learning assessment and assess knowledge base.  Interventions:  - Provide teaching at level of understanding  - Provide teaching via preferred learning methods  Outcome: Completed     Problem: PAIN - ADULT  Goal: Verbalizes/displays adequate comfort level or baseline comfort level  Description: Interventions:  - Encourage patient to monitor pain and request assistance  - Assess pain using appropriate pain scale  - Administer analgesics based on type and severity of pain and evaluate response  - Implement non-pharmacological measures as appropriate and evaluate response  - Consider cultural and social influences on pain and pain management  - Notify physician/advanced practitioner if interventions unsuccessful or patient reports new pain  Outcome: Adequate for Discharge     Problem: INFECTION - ADULT  Goal: Absence or prevention of progression during hospitalization  Description: INTERVENTIONS:  - Assess and monitor for signs and symptoms of infection  - Monitor lab/diagnostic results  - Monitor all insertion sites, i.e. indwelling lines, tubes, and drains  - Monitor endotracheal if appropriate and nasal secretions for changes in amount and color  - [place] appropriate cooling/warming therapies per order  - Administer medications as ordered  - Instruct and encourage patient and family to use good hand hygiene technique  - Identify and  instruct in appropriate isolation precautions for identified infection/condition  Outcome: Adequate for Discharge  Goal: Absence of fever/infection during neutropenic period  Description: INTERVENTIONS:  - Monitor WBC    Outcome: Adequate for Discharge     Problem: SAFETY ADULT  Goal: Patient will remain free of falls  Description: INTERVENTIONS:  - Educate patient/family on patient safety including physical limitations  - Instruct patient to call for assistance with activity   - Consult OT/PT to assist with strengthening/mobility   - Keep Call bell within reach  - Keep bed low and locked with side rails adjusted as appropriate  - Keep care items and personal belongings within reach  - Initiate and maintain comfort rounds  - Make Fall Risk Sign visible to staff  - Offer Toileting every  Hours, in advance of need  - Initiate/Maintain alarm  - Obtain necessary fall risk management equipment:   - Apply yellow socks and bracelet for high fall risk patients  - Consider moving patient to room near nurses station  Outcome: Adequate for Discharge  Goal: Maintain or return to baseline ADL function  Description: INTERVENTIONS:  -  Assess patient's ability to carry out ADLs; assess patient's baseline for ADL function and identify physical deficits which impact ability to perform ADLs (bathing, care of mouth/teeth, toileting, grooming, dressing, etc.)  - Assess/evaluate cause of self-care deficits   - Assess range of motion  - Assess patient's mobility; develop plan if impaired  - Assess patient's need for assistive devices and provide as appropriate  - Encourage maximum independence but intervene and supervise when necessary  - Involve family in performance of ADLs  - Assess for home care needs following discharge   - Consider OT consult to assist with ADL evaluation and planning for discharge  - Provide patient education as appropriate  Outcome: Adequate for Discharge  Goal: Maintains/Returns to pre admission functional  level  Description: INTERVENTIONS:  - Perform AM-PAC 6 Click Basic Mobility/ Daily Activity assessment daily.  - Set and communicate daily mobility goal to care team and patient/family/caregiver.   - Collaborate with rehabilitation services on mobility goals if consulted  - Perform Range of Motion times a day.  - Reposition patient every  hours.  - Dangle patient  times a day  - Stand patient  times a day  - Ambulate patient  times a day  - Out of bed to chair times a day   - Out of bed for meals  times a day  - Out of bed for toileting  - Record patient progress and toleration of activity level   Outcome: Adequate for Discharge     Problem: DISCHARGE PLANNING  Goal: Discharge to home or other facility with appropriate resources  Description: INTERVENTIONS:  - Identify barriers to discharge w/patient and caregiver  - Arrange for needed discharge resources and transportation as appropriate  - Identify discharge learning needs (meds, wound care, etc.)  - Arrange for interpretive services to assist at discharge as needed  - Refer to Case Management Department for coordinating discharge planning if the patient needs post-hospital services based on physician/advanced practitioner order or complex needs related to functional status, cognitive ability, or social support system  Outcome: Adequate for Discharge     Problem: POSTPARTUM  Goal: Experiences normal postpartum course  Description: INTERVENTIONS:  - Monitor maternal vital signs  - Assess uterine involution and lochia  Outcome: Adequate for Discharge  Goal: Appropriate maternal -  bonding  Description: INTERVENTIONS:  - Identify family support  - Assess for appropriate maternal/infant bonding   -Encourage maternal/infant bonding opportunities  - Referral to  or  as needed  Outcome: Adequate for Discharge  Goal: Establishment of infant feeding pattern  Description: INTERVENTIONS:  - Assess breast/bottle feeding  - Refer to lactation as  needed  Outcome: Adequate for Discharge  Goal: Incision(s), wounds(s) or drain site(s) healing without S/S of infection  Description: INTERVENTIONS  - Assess and document dressing, incision, wound bed, drain sites and surrounding tissue  - Provide patient and family education  - Perform skin care/dressing changes every   Outcome: Adequate for Discharge

## 2024-02-15 NOTE — QUICK NOTE
Elevated postpartum depression screen of 11.  Discussed score with patient this morning.  She stated that the screen specifically asked for the last 7 days and almost all of her anxiety and depression was surrounding the anticipation of delivery.  Now that she is delivered and her son is here and happy and healthy she is feeling much better and reassured.  She denies any troubles sleeping other than adjusting to a .  She denies any auditory or visual hallucinations.  She denies homicidal and suicidal ideation.  She states she feels comfortable expressing if things change and if she needs more help.  Discussed resources that may be in the center as well as other resources within information dropped off.  Many counseling resources are available and she can call to schedule appointment if needed.  Also discussed that OB/GYN office can be a resource that she can call if she is struggling or has difficulties accessing any of the resources provided here.  No need for inpatient case management or psychiatry consult at this time.

## 2024-02-15 NOTE — PLAN OF CARE
Problem: Knowledge Deficit  Goal: Verbalizes understanding of labor plan  Description: Assess patient/family/caregiver's baseline knowledge level and ability to understand information.  Provide education via patient/family/caregiver's preferred learning method at appropriate level of understanding.     1. Provide teaching at level of understanding.  2. Provide teaching via preferred learning method(s).  Outcome: Completed  Goal: Patient/family/caregiver demonstrates understanding of disease process, treatment plan, medications, and discharge instructions  Description: Complete learning assessment and assess knowledge base.  Interventions:  - Provide teaching at level of understanding  - Provide teaching via preferred learning methods  Outcome: Completed     Problem: PAIN - ADULT  Goal: Verbalizes/displays adequate comfort level or baseline comfort level  Description: Interventions:  - Encourage patient to monitor pain and request assistance  - Assess pain using appropriate pain scale  - Administer analgesics based on type and severity of pain and evaluate response  - Implement non-pharmacological measures as appropriate and evaluate response  - Consider cultural and social influences on pain and pain management  - Notify physician/advanced practitioner if interventions unsuccessful or patient reports new pain  2/15/2024 1245 by Natalie Aguilera RN  Outcome: Completed  2/15/2024 0912 by Natalie Aguilera RN  Outcome: Adequate for Discharge     Problem: INFECTION - ADULT  Goal: Absence or prevention of progression during hospitalization  Description: INTERVENTIONS:  - Assess and monitor for signs and symptoms of infection  - Monitor lab/diagnostic results  - Monitor all insertion sites, i.e. indwelling lines, tubes, and drains  - Monitor endotracheal if appropriate and nasal secretions for changes in amount and color  - [place] appropriate cooling/warming therapies per order  - Administer medications as ordered  -  Instruct and encourage patient and family to use good hand hygiene technique  - Identify and instruct in appropriate isolation precautions for identified infection/condition  2/15/2024 1245 by Natalie Aguilera RN  Outcome: Completed  2/15/2024 0912 by Natalie Aguilera RN  Outcome: Adequate for Discharge  Goal: Absence of fever/infection during neutropenic period  Description: INTERVENTIONS:  - Monitor WBC    2/15/2024 1245 by Natalie Aguilera RN  Outcome: Completed  2/15/2024 0912 by Natalie Aguilera RN  Outcome: Adequate for Discharge     Problem: SAFETY ADULT  Goal: Patient will remain free of falls  Description: INTERVENTIONS:  - Educate patient/family on patient safety including physical limitations  - Instruct patient to call for assistance with activity   - Consult OT/PT to assist with strengthening/mobility   - Keep Call bell within reach  - Keep bed low and locked with side rails adjusted as appropriate  - Keep care items and personal belongings within reach  - Initiate and maintain comfort rounds  - Make Fall Risk Sign visible to staff  - Offer Toileting every  Hours, in advance of need  - Initiate/Maintain alarm  - Obtain necessary fall risk management equipment:   - Apply yellow socks and bracelet for high fall risk patients  - Consider moving patient to room near nurses station  2/15/2024 1245 by Natalie Aguilera RN  Outcome: Completed  2/15/2024 0912 by Natalie Aguilera RN  Outcome: Adequate for Discharge  Goal: Maintain or return to baseline ADL function  Description: INTERVENTIONS:  -  Assess patient's ability to carry out ADLs; assess patient's baseline for ADL function and identify physical deficits which impact ability to perform ADLs (bathing, care of mouth/teeth, toileting, grooming, dressing, etc.)  - Assess/evaluate cause of self-care deficits   - Assess range of motion  - Assess patient's mobility; develop plan if impaired  - Assess patient's need for assistive devices and provide as  appropriate  - Encourage maximum independence but intervene and supervise when necessary  - Involve family in performance of ADLs  - Assess for home care needs following discharge   - Consider OT consult to assist with ADL evaluation and planning for discharge  - Provide patient education as appropriate  2/15/2024 1245 by Natalie Aguilera RN  Outcome: Completed  2/15/2024 0912 by Natalie Aguilera RN  Outcome: Adequate for Discharge  Goal: Maintains/Returns to pre admission functional level  Description: INTERVENTIONS:  - Perform AM-PAC 6 Click Basic Mobility/ Daily Activity assessment daily.  - Set and communicate daily mobility goal to care team and patient/family/caregiver.   - Collaborate with rehabilitation services on mobility goals if consulted  - Perform Range of Motion  times a day.  - Reposition patient every  hours.  - Dangle patient times a day  - Stand patient  times a day  - Ambulate patient  times a day  - Out of bed to chair  times a day   - Out of bed for meals  times a day  - Out of bed for toileting  - Record patient progress and toleration of activity level   2/15/2024 1245 by Natalie Aguilera RN  Outcome: Completed  2/15/2024 0912 by Natalie Aguilera RN  Outcome: Adequate for Discharge     Problem: DISCHARGE PLANNING  Goal: Discharge to home or other facility with appropriate resources  Description: INTERVENTIONS:  - Identify barriers to discharge w/patient and caregiver  - Arrange for needed discharge resources and transportation as appropriate  - Identify discharge learning needs (meds, wound care, etc.)  - Arrange for interpretive services to assist at discharge as needed  - Refer to Case Management Department for coordinating discharge planning if the patient needs post-hospital services based on physician/advanced practitioner order or complex needs related to functional status, cognitive ability, or social support system  2/15/2024 1245 by Natalie Aguilera RN  Outcome: Completed  2/15/2024  912 by Natalie Aguilera RN  Outcome: Adequate for Discharge     Problem: POSTPARTUM  Goal: Experiences normal postpartum course  Description: INTERVENTIONS:  - Monitor maternal vital signs  - Assess uterine involution and lochia  2/15/2024 1245 by Natalie Aguilera RN  Outcome: Completed  2/15/2024 0912 by Natalie Aguilera RN  Outcome: Adequate for Discharge  Goal: Appropriate maternal -  bonding  Description: INTERVENTIONS:  - Identify family support  - Assess for appropriate maternal/infant bonding   -Encourage maternal/infant bonding opportunities  - Referral to  or  as needed  2/15/2024 1245 by Natalie Aguilera RN  Outcome: Completed  2/15/2024 0912 by Natalie Aguilera RN  Outcome: Adequate for Discharge  Goal: Establishment of infant feeding pattern  Description: INTERVENTIONS:  - Assess breast/bottle feeding  - Refer to lactation as needed  2/15/2024 1245 by Natalie Aguilera RN  Outcome: Completed  2/15/2024 0912 by Natalie Aguilera RN  Outcome: Adequate for Discharge  Goal: Incision(s), wounds(s) or drain site(s) healing without S/S of infection  Description: INTERVENTIONS  - Assess and document dressing, incision, wound bed, drain sites and surrounding tissue  - Provide patient and family education  - Perform skin care/dressing changes every  2/15/2024 1245 by Natalie Aguilera RN  Outcome: Completed  2/15/2024 0912 by Natalie Aguilera RN  Outcome: Adequate for Discharge

## 2024-02-15 NOTE — LACTATION NOTE
This note was copied from a baby's chart.  CONSULT - LACTATION  Baby Boy (Shazia Perry 2 days male MRN: [id number]    [facility] AN ULTRASOUND Room / Bed: (N)/(N) Encounter: [id number]    Maternal Information     MOTHER:  Beatrice Perry  Maternal Age: 28 y.o.   OB History: # 1 - Date: 24, Sex: Male, Weight: 3790 g (8 lb 5.7 oz), GA: 41w1d, Delivery: Vaginal, Spontaneous, Apgar1: 8, Apgar5: 9, Living: Living, Birth Comments: None   Previouse breast reduction surgery? No    Lactation history:   Has patient previously breast fed: No   How long had patient previously breast fed:     Previous breast feeding complications:     History reviewed. No pertinent surgical history.     Birth information:  YOB: 2024   Time of birth: 7:35 PM   Sex: male   Delivery type: Vaginal, Spontaneous   Birth Weight: 3790 g (8 lb 5.7 oz)   Percent of Weight Change: -3%     Gestational Age: 41w1d   [unfilled]    Assessment     Breast and nipple assessment:  tender but not cracked nipples     Assessment: normal assessment    Feeding assessment: feeding well  LATCH:  Latch: Grasps breast, tongue down, lips flanged, rhythmic sucking   Audible Swallowing: Spontaneous and intermittent (24 hours old)   Type of Nipple: Everted (After stimulation)   Comfort (Breast/Nipple): Soft/non-tender   Hold (Positioning): Partial assist, teach one side, mother does other, staff holds   LATCH Score: 9         02/15/24 1300   Lactation Consultation   Reason for Consult 20 min;10 minutes   LATCH Documentation   Latch 2   Audible Swallowing 2   Type of Nipple 2   Comfort (Breast/Nipple) 2   Hold (Positioning) 1   LATCH Score 9   Having latch problems? No   Position(s) Used Cross Cradle  (supported by boppy.)   Breasts/Nipples   Left Breast Soft   Right Breast Soft   Left Nipple Everted;Tender   Right Nipple Everted;Tender   Breastfeeding Progress Breastfeeding well   Patient  Follow-Up   Lactation Consult Status 2   Follow-Up Type Inpatient   Other OB Lactation Documentation    Additional Problem Noted Colby was attatched shallowly at the breast resting on a breastfeeding support pillow. Beatrice c/o tenderness with latching. She reports it resolved when she supported her baby closer to her chest for better posture at the breast.       Feeding recommendations:  breast feed on demand    Beatrice denies questions for discharge. Verbalized understanding of presence of lactation support outpatient as well as S/S to call for lactation issues.    Encouraged parents to call for assistance, questions, and concerns about breastfeeding.    Natalya Blair RN 2/15/2024 1:52 PM

## 2024-02-16 PROCEDURE — 88307 TISSUE EXAM BY PATHOLOGIST: CPT | Performed by: PATHOLOGY

## 2024-03-05 ENCOUNTER — POSTPARTUM VISIT (OUTPATIENT)
Dept: OBGYN CLINIC | Facility: MEDICAL CENTER | Age: 29
End: 2024-03-05

## 2024-03-05 VITALS — SYSTOLIC BLOOD PRESSURE: 112 MMHG | WEIGHT: 149 LBS | BODY MASS INDEX: 28.15 KG/M2 | DIASTOLIC BLOOD PRESSURE: 78 MMHG

## 2024-03-05 DIAGNOSIS — Z91.89 AT RISK FOR BREASTFEEDING DIFFICULTY: ICD-10-CM

## 2024-03-05 PROCEDURE — 99024 POSTOP FOLLOW-UP VISIT: CPT | Performed by: NURSE PRACTITIONER

## 2024-03-05 NOTE — PATIENT INSTRUCTIONS
May gradually resume normal activities.  Continue prenatal vitamin daily while breastfeeding   Consider taking prenatal vitamin 6-12 months before a future pregnancy to reduce risk of neural tube defect.  Declined contraception at this point and plans to use a condom. If birht control is desired, return to office to discuss.   Resume sexual activity once comfortable and protected to do so. If breastfeeding, may need to use a lubricant with sex due to vaginal dryness.   If symptoms of depression occur, please call the office to schedule an appt. This may happen up until your baby's first birthday.  Return to office in 3-4 months for yearly exam, sooner as needed.

## 2024-03-05 NOTE — PROGRESS NOTES
"Beatrice Perry  1995    S:  28 y.o. female here for postpartum visit.  She is s/p  vaginal delivery  on 2/13/24 at 1935 .       Gender: male \"Colby Villarreal III\"  Apgars: 8, 9  Weight: 8 lbs 5.7 oz. He now weighs 8 lbs 1 oz. Next pedi appt on 3/18/23.   Her lochia is light yellow to pink. Only needs to wear a panty liner.     She is breastfeeding without problems until yesterday. She feels she may have a clogged left lower breast duct. Used heat and massage which made the area more tender. Ice and feeding on that side has improved the area. \"It is much soften than yesterday.\" Desires referral to [facility].  She denies postpartum blues/depression. Admits to crying more than normal. \"If just happens\"  but does not feel sad.  Her EPDS is 9.  Admits to being fatigued.     Last Pap: 04/23/2021 normal     We discussed contraceptive options in detail including birth control pills,  DepoProvera, Mirena/Kyleena IUD, Nexplanon in detail.  At this point she would like condoms.     O:   LMP 05/01/2023   She appears well and in no distress  Normal bilateral breast exam.   Abdomen is soft and nontender  External genitals are normal  Vagina is normal  Cervix, uterus and adnexa are nontender, no masses palpable.     A/P:  Postpartum visit.   May gradually resume normal activities.  Continue prenatal vitamin daily while breastfeeding   Consider taking prenatal vitamin 6-12 months before a future pregnancy to reduce risk of neural tube defect.  Declined contraception at this point and plans to use a condom. If birht control is desired, return to office to discuss.   Resume sexual activity once comfortable and protected to do so. If breastfeeding, may need to use a lubricant with sex due to vaginal dryness.   If symptoms of depression occur, please call the office to schedule an appt. This may happen up until your baby's first birthday.  Return to office in 3-4 months for yearly exam, sooner as needed.   "

## 2024-06-10 PROBLEM — Z34.03 ENCOUNTER FOR SUPERVISION OF NORMAL FIRST PREGNANCY IN THIRD TRIMESTER: Status: RESOLVED | Noted: 2023-08-22 | Resolved: 2024-06-10

## 2024-06-10 PROBLEM — Z34.90 ENCOUNTER FOR INDUCTION OF LABOR: Status: RESOLVED | Noted: 2024-02-12 | Resolved: 2024-06-10

## 2024-06-10 PROBLEM — O41.1290 CHORIOAMNIONITIS: Status: RESOLVED | Noted: 2024-02-14 | Resolved: 2024-06-10

## 2024-06-11 ENCOUNTER — ANNUAL EXAM (OUTPATIENT)
Dept: OBGYN CLINIC | Facility: MEDICAL CENTER | Age: 29
End: 2024-06-11
Payer: COMMERCIAL

## 2024-06-11 VITALS — WEIGHT: 135 LBS | SYSTOLIC BLOOD PRESSURE: 118 MMHG | BODY MASS INDEX: 25.51 KG/M2 | DIASTOLIC BLOOD PRESSURE: 84 MMHG

## 2024-06-11 DIAGNOSIS — Z01.419 ENCNTR FOR GYN EXAM (GENERAL) (ROUTINE) W/O ABN FINDINGS: Primary | ICD-10-CM

## 2024-06-11 DIAGNOSIS — R52 PAIN RELATED TO VAGINAL DELIVERY: ICD-10-CM

## 2024-06-11 DIAGNOSIS — Z12.4 ENCOUNTER FOR PAPANICOLAOU SMEAR FOR CERVICAL CANCER SCREENING: ICD-10-CM

## 2024-06-11 PROCEDURE — G0143 SCR C/V CYTO,THINLAYER,RESCR: HCPCS | Performed by: NURSE PRACTITIONER

## 2024-06-11 PROCEDURE — S0612 ANNUAL GYNECOLOGICAL EXAMINA: HCPCS | Performed by: NURSE PRACTITIONER

## 2024-06-11 NOTE — PATIENT INSTRUCTIONS
Pap every 3 years if normal.Collected today.   STI testing as indicated.Declined.   Exercise most days of week-minimum of 150-300 minutes per week.   Obtain appropriate diet and hydration.   Calcium 1000 mg (in divided doses-max 600 mg at one time) + 600 vit D daily.  Birth control declined. If desired, return to office to discuss.   HPV 9 vaccine series is complete.   Annual mammogram starting at age 40, monthly breast self exam recommended.   Kegels 20 times twice daily. Referred to pelvic floor PT.   Call your insurance company to verify coverage prior to completing any ordered tests.   Continued follow up with therapist recommended.   Return to office in one year or sooner, if needed.

## 2024-06-11 NOTE — PROGRESS NOTES
Assessment/Plan:  Pap every 3 years if normal.Collected today.   STI testing as indicated.Declined.   Exercise most days of week-minimum of 150-300 minutes per week.   Obtain appropriate diet and hydration.   Calcium 1000 mg (in divided doses-max 600 mg at one time) + 600 vit D daily.  Birth control declined. If desired, return to office to discuss.   HPV 9 vaccine series is complete.   Annual mammogram starting at age 40, monthly breast self exam recommended.   Kegels 20 times twice daily. Referred to pelvic floor PT.   Call your insurance company to verify coverage prior to completing any ordered tests.   Continued follow up with therapist recommended.   Return to office in one year or sooner, if needed.        1. Encntr for gyn exam (general) (routine) w/o abn findings  2. Encounter for Papanicolaou smear for cervical cancer screening  -     Liquid-based pap, screening  3. Pain related to vaginal delivery  -     Ambulatory referral to Physical Therapy; Future             Subjective:      Patient ID: Beatrice Perry is a 29 y.o. female.    HPI    Beatrice Perry is a 29 y.o.  ( 3 mo son Colby Villarreal ) female who is here today for her annual visit. No gynecologic health concerns.   Last in office on 3/5/24 for postpartum visit.   Amenorrheic as she is still breastfeeding.   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     Beatrice Perry is sexually active with male partner/  of 2 years. Monogamous and feels safe in this relationship. Denies vaginal bleeding or dryness.  Admits to some vaginal discomfort at entrace of vagina with both insertion and thrusting.  She uses condoms  for contraception.    She is not interested in STD screening today.   She denies vaginal discharge, itching or pelvic pain.   She has no urinary concerns, does not have incontinence.  No bowel concerns.  No breast concerns.     Last pap: 2021 normal   Mammogram: Not on file   Colonoscopy: Not  on file  DEXA scan: Not on file  HPV vaccine series:Completed  EPDS of 11. Attends weekly therapy session. Denies SI/HI.     Family history of cancer:   Cancer-related family history is negative for Breast cancer, Colon cancer, Ovarian cancer, Uterine cancer, and Cervical cancer.      The following portions of the patient's history were reviewed and updated as appropriate: allergies, current medications, past family history, past medical history, past social history, past surgical history, and problem list.    Review of Systems   Constitutional: Negative.  Negative for activity change, appetite change, chills, diaphoresis, fatigue, fever and unexpected weight change.   HENT:  Negative for congestion, dental problem, sneezing, sore throat and trouble swallowing.    Eyes:  Negative for visual disturbance.   Respiratory:  Negative for chest tightness and shortness of breath.    Cardiovascular:  Negative for chest pain and leg swelling.   Gastrointestinal:  Negative for abdominal pain, constipation, diarrhea, nausea and vomiting.   Genitourinary:  Positive for dyspareunia and menstrual problem (amenorrhea). Negative for difficulty urinating, dysuria, frequency, hematuria, pelvic pain, urgency, vaginal bleeding, vaginal discharge and vaginal pain.   Musculoskeletal:  Negative for back pain and neck pain.   Skin: Negative.    Allergic/Immunologic: Negative.    Neurological:  Negative for weakness and headaches.   Hematological:  Negative for adenopathy.   Psychiatric/Behavioral: Negative.           Objective:      /84 (BP Location: Left arm, Patient Position: Sitting, Cuff Size: Standard)   Wt 61.2 kg (135 lb)   Breastfeeding Yes   BMI 25.51 kg/m²          Physical Exam  Vitals and nursing note reviewed.   Constitutional:       Appearance: Normal appearance. She is well-developed.   HENT:      Head: Normocephalic and atraumatic.   Eyes:      General:         Right eye: No discharge.         Left eye: No  discharge.   Neck:      Thyroid: No thyromegaly.      Trachea: Trachea normal.   Cardiovascular:      Rate and Rhythm: Normal rate and regular rhythm.      Heart sounds: Normal heart sounds.   Pulmonary:      Effort: Pulmonary effort is normal.      Breath sounds: Normal breath sounds.   Chest:   Breasts:     Breasts are symmetrical.      Right: Normal. No inverted nipple, mass, nipple discharge, skin change or tenderness.      Left: Normal. No inverted nipple, mass, nipple discharge, skin change or tenderness.   Abdominal:      Palpations: Abdomen is soft.   Genitourinary:     General: Normal vulva.      Exam position: Lithotomy position.      Labia:         Right: No rash, tenderness, lesion or injury.         Left: No rash, tenderness, lesion or injury.       Urethra: No prolapse, urethral pain, urethral swelling or urethral lesion.      Vagina: No signs of injury and foreign body. Tenderness (at lower entrace of vagina) present. No vaginal discharge, erythema or bleeding.      Cervix: Normal.      Uterus: Normal.       Adnexa:         Right: No mass, tenderness or fullness.          Left: No mass, tenderness or fullness.        Rectum: No external hemorrhoid.   Musculoskeletal:         General: Normal range of motion.      Cervical back: Normal range of motion and neck supple.   Lymphadenopathy:      Head:      Right side of head: No submental, submandibular or tonsillar adenopathy.      Left side of head: No submental, submandibular or tonsillar adenopathy.      Cervical: No cervical adenopathy.      Upper Body:      Right upper body: No supraclavicular or axillary adenopathy.      Left upper body: No supraclavicular or axillary adenopathy.      Lower Body: No right inguinal adenopathy. No left inguinal adenopathy.   Skin:     General: Skin is warm and dry.   Neurological:      Mental Status: She is alert and oriented to person, place, and time.   Psychiatric:         Mood and Affect: Mood normal.          Behavior: Behavior normal.

## 2024-06-17 LAB
LAB AP GYN PRIMARY INTERPRETATION: NORMAL
Lab: NORMAL

## 2024-07-24 ENCOUNTER — NURSE TRIAGE (OUTPATIENT)
Age: 29
End: 2024-07-24

## 2024-07-24 NOTE — TELEPHONE ENCOUNTER
Regarding last note A message was left for pt to call back and schedule for tomorrow 7/25 at 3:45 with Griselda in our [place] office

## 2024-07-24 NOTE — TELEPHONE ENCOUNTER
"Patient called in stating that she's having pain in her left breast and a lump on the bottom of her breast as well. Pt also reporting a white dot on her nipple which is new to her. Pt reporting this started this morning. Pt reporting a clogged duct and pt reporting she can't get it to empty. Pt reports breastfeeding.       Epic Secure Chat sent to Dr Hernandez:  Epic Secure Chat response:  she can be seen in the office in the next few days to evaluate the lump. Otherwise, she can continue to stimulate and use warm compresses to help with clogged duct. If she thinks she developed an infection I can send antibiotics     Patient was notified of Dr Villarreal recommendations, pt stated that she doesn't think that she's starting an infection, pt refused antibiotics at this time.     Patient was notified that a message will be sent to the office for scheduling purposes, pt verbalized understanding         Reason for Disposition   Breast lump    Answer Assessment - Initial Assessment Questions  1. SYMPTOM: \"What's the main symptom you're concerned about?\"  (e.g., lump, pain, rash, nipple discharge)      Pt reporting pain in her left breast, and a lump on the bottom     3. ONSET: \"When did pain and lump  start?\"      Pt reporting this morning   4. PRIOR HISTORY: \"Do you have any history of prior problems with your breasts?\" (e.g., lumps, cancer, fibrocystic breast disease)      Pt denies   5. CAUSE: \"What do you think is causing this symptom?\"      Pt reporting a possible clogged duct   6. OTHER SYMPTOMS: \"Do you have any other symptoms?\" (e.g., fever, breast pain, redness or rash, nipple discharge)      Pt denies fevers, redness, rashes or nipple discharge   7. PREGNANCY-BREASTFEEDING: \"Is there any chance you are pregnant?\" \"When was your last menstrual period?\" \"Are you breastfeeding?\"      Pt is currently breastfeeding.    Protocols used: Breast Symptoms-ADULT-[place]    "

## 2024-08-20 ENCOUNTER — NURSE TRIAGE (OUTPATIENT)
Age: 29
End: 2024-08-20

## 2024-08-20 NOTE — TELEPHONE ENCOUNTER
"Patient calling in stating that she has a clogged duct in her left breast. Pt reporting that she would like an appt to figure out why this keeps occurring. Pt delivered on 2/13/24. Pt reporting breast pain that is 3/10. Pt denies redness on the breast, fevers     Patient requesting to be seen, as per protocol pt is to be seen within 3 days, patient verbalized understanding. Patient was notified that there are no sooner appts available, and pt was warm transferred to [place] in the office for further assistance.         Reason for Disposition   Patient wants to be seen    Answer Assessment - Initial Assessment Questions  1. PAIN: \"How bad is the pain?\"  (Scale 1-10; or mild, moderate, severe)    - MILD - doesn't interfere with normal activities     - MODERATE - interferes with normal activities or awakens from sleep     - SEVERE - excruciating pain, unable to do any normal activities   Pt reporting 3/10           2. SKIN: \"Does the skin appear red?\"        Pt denies   3. LOCATION: \"Which breast?\" (e.g., left, right, both)      Left breast   4. DELIVERY: \"When was the baby born?\"       2/13/24  5. BREASTFEEDING: \"Have you been breastfeeding?\" If yes, ask: \"When did you stop?\"      Pt reporting continuing breastfeeding   6. ONSET: \"When did the breast pain start?\" (e.g., hours, days)      Pt reporting the pain started this morning.   7. FEVER: \"Do you have a fever?\" If Yes, ask: \"What is it, how was it measured, and when did it start?\"      Pt denies fevers   8. OTHER SYMPTOMS: \"Do you have any other symptoms?\" (e.g., abdominal pain, feeling sad or depressed, weakness)      Pt denies abdominal pain, feelings of sad or depressed weakness    Protocols used: Postpartum - Breast Pain and Engorgement-ADULT-[place]    "

## 2024-08-22 ENCOUNTER — OFFICE VISIT (OUTPATIENT)
Age: 29
End: 2024-08-22
Payer: COMMERCIAL

## 2024-08-22 VITALS
BODY MASS INDEX: 25.15 KG/M2 | WEIGHT: 133.2 LBS | SYSTOLIC BLOOD PRESSURE: 92 MMHG | HEIGHT: 61 IN | TEMPERATURE: 97.1 F | HEART RATE: 75 BPM | DIASTOLIC BLOOD PRESSURE: 64 MMHG

## 2024-08-22 DIAGNOSIS — S20.129A MILK BLEB, POSTPARTUM: Primary | ICD-10-CM

## 2024-08-22 DIAGNOSIS — O92.29 MILK BLEB, POSTPARTUM: Primary | ICD-10-CM

## 2024-08-22 PROCEDURE — 99213 OFFICE O/P EST LOW 20 MIN: CPT | Performed by: OBSTETRICS & GYNECOLOGY

## 2024-08-22 NOTE — ASSESSMENT & PLAN NOTE
Pt with recurrent milk blebs in left nipple- reviewed ongoing conservative measures for management including massage, warm compresses, soaking with warm water.   Recommended evaluation with lactation- patient thinks that latch may be changing as baby is teething and chewing more which could be contributing to some nipple trauma/callusing  Referral placed today  No s/s of infection today- reviewed in detail

## 2024-08-22 NOTE — PROGRESS NOTES
Ambulatory Visit  Name: Beatrice Perry      : 1995      MRN: [id number]  Encounter Provider: Alnia Donohue DO  Encounter Date: 2024   Encounter department: [facility] [place]    Assessment & Plan   1. Milk bleb, postpartum  Assessment & Plan:  Pt with recurrent milk blebs in left nipple- reviewed ongoing conservative measures for management including massage, warm compresses, soaking with warm water.   Recommended evaluation with lactation- patient thinks that latch may be changing as baby is teething and chewing more which could be contributing to some nipple trauma/callusing  Referral placed today  No s/s of infection today- reviewed in detail  Orders:  -     Ambulatory Referral to Lactation; Future      History of Present Illness       Beatrice Perry is a 29 y.o.   who presents today to discuss clogged milk duct.     She recently delivered in 2024- has been breast feeding since  Has been going well but she continues to intermittently get a clogged milk duct in her left breast.   She notes that she feels it in her nipple- it is a bleb that she sometimes gets very large.   The only way to relieve it is actually open the skin and relieve the milk out.     She notes that over the last two months this has started and she gets it intermittently every 1-2 weeks  The biggest change is that the baby has started sleeping for longer stretches and is not feeding all the time overnight. She often wakes up and feels very full  She   Not pumping at night. She only pumps on the right side once a day     She has not seen a lactation consultant.     Review of Systems  Negative unless otheriwse noted in HPI    Past Medical History   Past Medical History:   Diagnosis Date    Suspicion for Chorioamnionitis 2024    Varicella     twice     History reviewed. No pertinent surgical history.  Family History   Problem Relation Age of Onset    No Known  "Problems Mother     Asthma Father     Diabetes Maternal Grandmother     Heart failure Maternal Grandmother     Diabetes Paternal Grandmother     Heart attack Paternal Grandmother     Diabetes Paternal Grandfather     Heart failure Paternal Grandfather     Hyperlipidemia Paternal Grandfather     Hypertension Paternal Grandfather     Breast cancer Neg Hx     Colon cancer Neg Hx     Ovarian cancer Neg Hx     Uterine cancer Neg Hx     Cervical cancer Neg Hx      Current Outpatient Medications on File Prior to Visit   Medication Sig Dispense Refill    Prenatal Vit-Fe Fumarate-FA (prenatal vitamin) 28-0.8 mg Take 1 tablet by mouth daily 90 tablet 3    Wheat Dextrin (BENEFIBER PO) Take by mouth      witch hazel-glycerin (TUCKS) topical pad Apply 1 Pad topically every 4 (four) hours as needed for irritation       No current facility-administered medications on file prior to visit.     Allergies   Allergen Reactions    Escitalopram Rash    Fluoxetine Rash    Amoxicillin-Pot Clavulanate Itching and Rash    Cefixime Rash    Levonorgestrel Rash    Pollen Extract Sneezing      Objective     BP 92/64 (BP Location: Left arm, Patient Position: Sitting, Cuff Size: Adult)   Pulse 75   Temp (!) 97.1 °F (36.2 °C) (Temporal)   Ht 5' 1\" (1.549 m)   Wt 60.4 kg (133 lb 3.2 oz)   LMP  (LMP Unknown)   Breastfeeding Yes   BMI 25.17 kg/m²     Physical Exam  Constitutional:       General: She is not in acute distress.  HENT:      Head: Normocephalic and atraumatic.      Mouth/Throat:      Mouth: Mucous membranes are moist.   Cardiovascular:      Rate and Rhythm: Normal rate.   Pulmonary:      Effort: Pulmonary effort is normal. No respiratory distress.   Chest:   Breasts:     Right: No inverted nipple, mass, nipple discharge, skin change or tenderness.      Left: No inverted nipple, mass, nipple discharge, skin change or tenderness.       Abdominal:      General: There is no distension.      Palpations: Abdomen is soft.      Tenderness: " There is no abdominal tenderness.   Skin:     General: Skin is warm and dry.   Neurological:      Mental Status: She is alert.   Psychiatric:         Mood and Affect: Mood normal.         Behavior: Behavior normal.

## 2024-09-17 ENCOUNTER — OFFICE VISIT (OUTPATIENT)
Dept: POSTPARTUM | Facility: CLINIC | Age: 29
End: 2024-09-17
Payer: COMMERCIAL

## 2024-09-17 DIAGNOSIS — S20.129A MILK BLEB, POSTPARTUM: ICD-10-CM

## 2024-09-17 DIAGNOSIS — O92.29 MILK BLEB, POSTPARTUM: ICD-10-CM

## 2024-09-17 PROCEDURE — 99404 PREV MED CNSL INDIV APPRX 60: CPT | Performed by: PEDIATRICS

## 2024-09-17 NOTE — PROGRESS NOTES
INITIAL BREAST FEEDING EVALUATION    Informant/Relationship: Beatrice and Colby    Discussion of General Lactation Issues: Beatrice has been getting blebs on the left nipple recurrently for the last 2 months. When the blebs occur, Beatrice also experiences significant engorgement. Beatrice reached out to her OB/Gyn when the bleb first occurred but she was unable to get an appointment.  Beatrice resolved the bleb by piercing it with a clean needle.  She has done this twice so far. She has tried warm compresses, saline soaks with a Haakaa.  At other times, Colby is able to resolve the bleb for her.   Breastfeeding was quite painful for the first 1.5 months but from that point until 5 months, breastfeeding was comfortable and there were no concerns.     Infant is 7 months old today.       Past Medical History:   Diagnosis Date    Suspicion for Chorioamnionitis 02/14/2024    Varicella     twice         Current Outpatient Medications:     Prenatal Vit-Fe Fumarate-FA (prenatal vitamin) 28-0.8 mg, Take 1 tablet by mouth daily, Disp: 90 tablet, Rfl: 3    Wheat Dextrin (BENEFIBER PO), Take by mouth, Disp: , Rfl:     witch hazel-glycerin (TUCKS) topical pad, Apply 1 Pad topically every 4 (four) hours as needed for irritation, Disp: , Rfl:     Allergies   Allergen Reactions    Escitalopram Rash    Fluoxetine Rash    Amoxicillin-Pot Clavulanate Itching and Rash    Cefixime Rash    Levonorgestrel Rash    Pollen Extract Sneezing       Social History     Substance and Sexual Activity   Drug Use Not Currently    Types: Marijuana    Comment: [id number]       Social History former smoker    Interval Breastfeeding History:  Frequency of breast feeding: Every 2-3 hours during the day and 1-2 times over night.  Does mother feel breastfeeding is effective: Yes, but Beatrice feels his latch is shallow and in the last few months he pulls on the nipple as he feeds  Does infant appear satisfied after nursing:No  Stooling pattern normal:  Yes  Urinating frequently:Yes  Using shield or shells: No    Alternative/Artificial Feedings:   Bottle: Colby refuses to take a bottle           Formula Type: none                     Amount: n/a            Breast Milk:                      Amount: none            Frequency Q 2-8 Hr between feedings  Elimination Problems: No      Equipment:  Nipple Shield             Type: none             Size: n/a             Frequency of Use: n/a  Pump            Type: Lansinoh            Frequency of Use: had been pumping the right breast first thing in the morning but stopped about a month ago.  She was expressing 3-6 ounces each session.  All of this milk has been stored  Shells            Type: none            Frequency of use: n/a    Equipment Problems: no    Mom:  Breast: Medium sized slightly asymmetrical breasts.  L>R.  Rounded shape.  Closely spaced  Nipple Assessment in General: Normal: elongated/eraser, no discoloration and no damage noted.  Mother's Awareness of Feeding Cues                 Recognizes: Yes                  Verbalizes: Yes  Support System: FOB  History of Breastfeeding: none  Changes/Stressors/Violence: Beatrice is concerned about her nipple blebs.  Concerns/Goals: Beatrice desires to resolve the blebs and continue breastfeeding for at least a year    Problems with Mom: recurrent nipples    Physical Exam  Constitutional:       Appearance: Normal appearance.   HENT:      Head: Normocephalic and atraumatic.      Nose: Nose normal.   Pulmonary:      Effort: Pulmonary effort is normal.   Musculoskeletal:         General: Normal range of motion.      Cervical back: Normal range of motion and neck supple.   Neurological:      Mental Status: She is alert and oriented to person, place, and time.   Skin:     General: Skin is warm and dry.   Psychiatric:         Mood and Affect: Mood normal.         Behavior: Behavior normal.         Thought Content: Thought content normal.         Judgment: Judgment normal.          Infant:  Behaviors: Alert  Color: Normal  Birth weight: 3790 grams  Current weight: 10.160 kg    Problems with infant: ?shallow latch, tugs on the nipple as he feeds.      General Appearance:  Alert, active, no distress                             Head:  Normocephalic, AFOF, sutures opposed                             Eyes:  Conjunctiva clear, no drainage                              Ears:  Normally placed, no anomolies                             Nose:  No drainage or erythema                           Mouth:  No lesions.  Unable to assess movement or function effectively due to Colby's lack of cooperation.                    Neck:  Supple, symmetrical, trachea midline                 Respiratory:  No grunting, flaring, retractions, breath sounds clear and equal            Cardiovascular:  Regular rate and rhythm. No murmur. Adequate perfusion/capillary refill.                     Abdomen:   Soft, non-tender, no masses, bowel sounds present, no HSM             Genitourinary:  Normal male, testes descended, no discharge, swelling, or pain, anus patent                          Spine:   No abnormalities noted        Musculoskeletal:  Full range of motion          Skin/Hair/Nails:   Skin warm, dry, and intact, no rashes or abnormal dyspigmentation or lesions                Neurologic:   No abnormal movement, tone appropriate    Latch:  Efficiency:               Lips Flanged: Yes              Depth of latch: fair, there was some areola in his mouth.              Audible Swallow: Occasionally.  No sustained SSB              Visible Milk: not on the left breast              Wide Open/ Asymmetrical: asymmetrical but not optimally wide              Suck Swallow Cycle: Breathing: unlabored, Coordinated: yes  Nipple Assessment after latch: Flat   Latch Problems: Latch was shallow enough to lead to slight distortion of the nipple.  Colby did a lot of sucking but very little swallowing while feeding.  He popped off a  lot (there was definitely some distraction causing some of the pop offs).  He fed on both breasts and appeared completely content.    Position:  Infant's Ergonomics/Body               Body Alignment: Yes               Head Supported: Yes               Close to Mom's body/ Lifted/ Supported: Yes               Mom's Ergonomics/Body: Yes                           Supported: Yes                           Sitting Back: Yes                           Brings Baby to her breast: Yes  Positioning Problems: None      Handouts:   None    Education:  Reviewed Mom/Breast care: Discussed appropriate handling of the breasts to avoid pain, inflammation and trauma. Reviewed the recommended care for nipple blebs.      Plan:    Reassurance and support given.  I acknowledged Beatrice's concerns and her desire to continue breastfeeding.  I encouraged her to continue to nurse Colby on demand.  She positions him effectively at the breast.    I reviewed how to handle her breasts gently to avoid inflammation pain and injury.  I reviewed treatment and prevention of nipple blebs per [facility] Protocol #36.  I recommended against unroofing the nipple blebs, using heat, aggressive massage, saline soaks or extra milk expression except as needed for comfort as treatment for the blebs.  I encouraged her to call for more support if the above recommendations do not resolve her concerns.  I encouraged her to call with any breastfeeding related questions or concerns.    I have spent 60 minutes with Patient and family today in which greater than 50% of this time was spent in counseling/coordination of care regarding Patient and family education and Counseling / Coordination of care.

## 2024-09-17 NOTE — PATIENT INSTRUCTIONS
Continue to nurse Colby on demand. Use gentle breast compressions to increase flow as needed and switch breasts when Colby appears frustrated or will no longer stay latched.  To help prevent nipple blebs, take lecithin 1200 mg twice a day.  If you develop a nipple bleb, increase to 4 times a day until the bleb heals.  To resolve nipple blebs, apply a protective oil/ointment to your nipple after feeding and cover with a clean pieced of parchment paper.  4 times a day, in addition to the oil, apply a scant amount of 1% hydrocortisone cream with the tip of a toothpick just to the bleb. Avoid piercing/unroofing the blebs with a nipple.  Do not do saline soaks. No additional milk expression is needed unless as needed for comfort.  Please call with any questions or concerns.

## 2024-09-17 NOTE — PROGRESS NOTES
I have reviewed the notes, assessments, and/or procedures performed by Lainey Wellington RN, IBCLC, I concur with her/his documentation of Beatrice Ortiz MD 09/17/24

## 2024-09-22 ENCOUNTER — NURSE TRIAGE (OUTPATIENT)
Dept: OTHER | Facility: OTHER | Age: 29
End: 2024-09-22

## 2024-09-22 DIAGNOSIS — N61.0 MASTITIS: Primary | ICD-10-CM

## 2024-09-22 NOTE — TELEPHONE ENCOUNTER
"Reason for Disposition  • [1] Breast lump AND [2] present < 7 days    Answer Assessment - Initial Assessment Questions  1. SYMPTOM: \"What's the main symptom you're concerned about?\" (e.g., pain, redness, swelling)      Pain, swelling left breast  2. ONSET: \"When did the  pain/swelling  start?\"      This morning  3. LOCATION: \"Which breast?\" (e.g., left, right, both) \"Is the pain in the breast or just the nipple?\"      Left breast lumps, white spot on left nipple   4. PAIN: \"How bad is the pain?\"  (Scale 1-10; or mild, moderate, severe)      7/10  5. REDNESS: \"Does the skin appear red? Does the breast feel hot to touch?\"       No, slightly warm  6. LUMP OR SWELLING: \"Does the breast feel hard or have lumps?\"      Lumps on left breast tissue, white spot on left nipple  7. DELIVERY DATE: \"When was your delivery date?\" \"Vaginal delivery or ?\"      24, vaginal delivery  8. BREASTFEEDING AND PUMPING: \"Did you breastfeed your baby or use a breast pump after delivery?\" If Yes, ask: \"When did you last breastfeed or pump your breasts?\"      Breast feeding, no pumping  9. FEVER: \"Do you have a fever?\" If Yes, ask: \"What is it, how was it measured, and when did it start?\"      No  10. OTHER SYMPTOMS: \"Do you have any other symptoms?\" (e.g., feeling sad or depressed, nipple symptoms)        No    Protocols used: Postpartum - Breast Pain and Engorgement-Adult-AH    "

## 2024-09-22 NOTE — TELEPHONE ENCOUNTER
"Regarding: Engorged  ----- Message from Saray TIWARI sent at 9/22/2024  4:34 PM EDT -----  \" I have a very painful back up of milk right now\"    "

## 2024-09-23 ENCOUNTER — TELEPHONE (OUTPATIENT)
Dept: POSTPARTUM | Facility: CLINIC | Age: 29
End: 2024-09-23

## 2024-09-23 RX ORDER — SULFAMETHOXAZOLE/TRIMETHOPRIM 800-160 MG
1 TABLET ORAL EVERY 12 HOURS SCHEDULED
Qty: 14 TABLET | Refills: 0 | Status: SHIPPED | OUTPATIENT
Start: 2024-09-23 | End: 2024-09-30

## 2024-09-23 NOTE — TELEPHONE ENCOUNTER
Dr. Collazo,    Should patient be seen or can we call something in for her? Tried reaching her but no answer. Just want to know what the protocol is when she calls back. I told her to confirm her pharmacy when she calls.

## 2024-09-23 NOTE — TELEPHONE ENCOUNTER
"Beatrice reports that under her breasts are red, but not painful. Bleb is improving somewhat. She states on the side where she experiences the blebs she tends to \"fill up\" more quickly. She does feel her son has a marked preference for that side. She did recently start a different nipple ointment to manage her recurrent blebs but she has used this in the past without issues.     Apply cold compresses as needed to that breast and avoid excess milk removal, however, okay to empty to the point of comfort if breast does become uncomfortably full. Discussed ibuprofen as a way to manage breast or nipple pain. Continue with recommendations for treating nipple blebs as discussed with Lainey at her appt last week.  Offer Colby the opposite breast as much as possible if feeling like he is gravitating towards the other side, after a few days of more stimulation things may balance out a little and help him be more willing to nurse on each side.     Discussed that a follow up will be helpful if she is not seeing improvement of her breast symptoms within the next few days.   "

## 2024-09-23 NOTE — TELEPHONE ENCOUNTER
Beatrice called - has c/o a nipple bleb, says its draining now but also noticed that under her breasts are red.  Concerned if mastitis.    Colby 7m.

## 2024-09-24 ENCOUNTER — OFFICE VISIT (OUTPATIENT)
Dept: FAMILY MEDICINE CLINIC | Facility: CLINIC | Age: 29
End: 2024-09-24
Payer: COMMERCIAL

## 2024-09-24 VITALS
WEIGHT: 131 LBS | SYSTOLIC BLOOD PRESSURE: 116 MMHG | TEMPERATURE: 98.4 F | HEART RATE: 100 BPM | BODY MASS INDEX: 24.73 KG/M2 | OXYGEN SATURATION: 99 % | DIASTOLIC BLOOD PRESSURE: 74 MMHG | HEIGHT: 61 IN

## 2024-09-24 DIAGNOSIS — Z00.00 ANNUAL PHYSICAL EXAM: Primary | ICD-10-CM

## 2024-09-24 DIAGNOSIS — N64.89 OBSTRUCTED MAMMARY DUCT: ICD-10-CM

## 2024-09-24 PROCEDURE — 99395 PREV VISIT EST AGE 18-39: CPT | Performed by: FAMILY MEDICINE

## 2024-09-24 NOTE — PROGRESS NOTES
Adult Annual Physical  Name: Beatrice Perry      : 1995      MRN: [id number]  Encounter Provider: Umu Chong DO  Encounter Date: 2024   Encounter department: [facility] [address]    Assessment & Plan  Annual physical exam         Obstructed mammary duct  Discussed red flag symptoms and signs of mastitis. Advised that OB/GYN has placed ABX order for her and if she does not have improvement over the next 24 hours with conservative management, she should start ABX. Also advised to start ABX if she develops fever.        Immunizations and preventive care screenings were discussed with patient today. Appropriate education was printed on patient's after visit summary.    Counseling:  Alcohol/drug use: discussed moderation in alcohol intake, the recommendations for healthy alcohol use, and avoidance of illicit drug use.  Dental Health: discussed importance of regular tooth brushing, flossing, and dental visits.  Sexual health: discussed sexually transmitted diseases, partner selection, use of condoms, avoidance of unintended pregnancy, and contraceptive alternatives.  Exercise: the importance of regular exercise/physical activity was discussed. Recommend exercise 3-5 times per week for at least 30 minutes.        History of Present Illness     Adult Annual Physical:  Patient presents for annual physical. Left breast pain started 2 days ago, notes that there is redness and some blebs, denies any purulent drainage. She is breast feeding. She is not pumping. Notes that breast feeding helps with some of the discomfort. Had fever yesterday but had COVID vaccine day before and this always gives her a fever. Denies fever or chills today. Denies change in fatigue. .     Diet and Physical Activity:  - Diet/Nutrition: well balanced diet.  - Exercise: no formal exercise.    Depression Screening:  - PHQ-2 Score: 0    General Health:  - Sleep: sleeps well.  - Hearing: normal  "hearing bilateral ears.  - Vision: goes for regular eye exams, wears glasses and most recent eye exam > 1 year ago.  - Dental: brushes teeth twice daily and no dental visits for > 1 year.    /GYN Health:  - Follows with GYN: yes.   - Menopause: premenopausal.   - Last menstrual cycle: 5/1/2023.   - History of STDs: no    Advanced Care Planning:  - Has an advanced directive?: no    - Has a durable medical POA?: no    - ACP document given to patient?: yes      Review of Systems      Objective     /74 (BP Location: Left arm, Patient Position: Sitting, Cuff Size: Standard)   Pulse 100   Temp 98.4 °F (36.9 °C) (Tympanic)   Ht 5' 1\" (1.549 m)   Wt 59.4 kg (131 lb)   SpO2 99%   BMI 24.75 kg/m²     Physical Exam  Vitals reviewed.   Constitutional:       General: She is not in acute distress.     Appearance: Normal appearance.   HENT:      Head: Normocephalic and atraumatic.      Right Ear: External ear normal.      Left Ear: External ear normal.      Nose: Nose normal.      Mouth/Throat:      Mouth: Mucous membranes are moist.   Eyes:      Extraocular Movements: Extraocular movements intact.      Conjunctiva/sclera: Conjunctivae normal.      Pupils: Pupils are equal, round, and reactive to light.   Cardiovascular:      Rate and Rhythm: Normal rate and regular rhythm.      Heart sounds: Normal heart sounds.   Pulmonary:      Effort: Pulmonary effort is normal.      Breath sounds: Normal breath sounds.   Chest:   Breasts:     Right: Normal.      Left: Skin change present. No mass, nipple discharge or tenderness.       Abdominal:      General: Bowel sounds are normal. There is no distension.      Palpations: Abdomen is soft.      Tenderness: There is no abdominal tenderness.   Musculoskeletal:      Cervical back: Neck supple.      Right lower leg: No edema.      Left lower leg: No edema.   Lymphadenopathy:      Cervical: No cervical adenopathy.      Upper Body:      Right upper body: No supraclavicular, axillary " or pectoral adenopathy.      Left upper body: No supraclavicular, axillary or pectoral adenopathy.   Skin:     General: Skin is warm.      Capillary Refill: Capillary refill takes less than 2 seconds.      Findings: No rash.   Neurological:      Mental Status: She is alert. Mental status is at baseline.             Umu Chong DO  [facility]  9/24/2024 1:51 PM

## 2025-08-07 ENCOUNTER — ANNUAL EXAM (OUTPATIENT)
Dept: OBGYN CLINIC | Facility: MEDICAL CENTER | Age: 30
End: 2025-08-07
Payer: COMMERCIAL

## 2025-08-07 VITALS
DIASTOLIC BLOOD PRESSURE: 78 MMHG | BODY MASS INDEX: 24.55 KG/M2 | SYSTOLIC BLOOD PRESSURE: 120 MMHG | HEIGHT: 61 IN | WEIGHT: 130 LBS

## 2025-08-07 DIAGNOSIS — B36.0 TINEA VERSICOLOR: ICD-10-CM

## 2025-08-07 DIAGNOSIS — Z01.411 ENCNTR FOR GYN EXAM (GENERAL) (ROUTINE) W ABNORMAL FINDINGS: Primary | ICD-10-CM

## 2025-08-07 PROCEDURE — S0612 ANNUAL GYNECOLOGICAL EXAMINA: HCPCS | Performed by: NURSE PRACTITIONER

## 2025-08-07 RX ORDER — SELENIUM SULFIDE 2.5 MG/100ML
LOTION TOPICAL
Qty: 118 ML | Refills: 0 | Status: SHIPPED | OUTPATIENT
Start: 2025-08-07

## 2025-08-14 ENCOUNTER — APPOINTMENT (OUTPATIENT)
Dept: LAB | Facility: HOSPITAL | Age: 30
End: 2025-08-14
Payer: COMMERCIAL

## 2025-08-14 ENCOUNTER — OFFICE VISIT (OUTPATIENT)
Dept: FAMILY MEDICINE CLINIC | Facility: CLINIC | Age: 30
End: 2025-08-14
Payer: COMMERCIAL

## 2025-08-22 ENCOUNTER — TELEPHONE (OUTPATIENT)
Age: 30
End: 2025-08-22